# Patient Record
Sex: FEMALE | Race: WHITE | NOT HISPANIC OR LATINO | Employment: UNEMPLOYED | URBAN - METROPOLITAN AREA
[De-identification: names, ages, dates, MRNs, and addresses within clinical notes are randomized per-mention and may not be internally consistent; named-entity substitution may affect disease eponyms.]

---

## 2019-05-24 ENCOUNTER — APPOINTMENT (OUTPATIENT)
Dept: GENERAL RADIOLOGY | Facility: CLINIC | Age: 19
End: 2019-05-24
Attending: EMERGENCY MEDICINE
Payer: COMMERCIAL

## 2019-05-24 ENCOUNTER — HOSPITAL ENCOUNTER (EMERGENCY)
Facility: CLINIC | Age: 19
Discharge: HOME | End: 2019-05-24
Attending: EMERGENCY MEDICINE | Admitting: EMERGENCY MEDICINE
Payer: COMMERCIAL

## 2019-05-24 ENCOUNTER — APPOINTMENT (OUTPATIENT)
Dept: ULTRASOUND IMAGING | Facility: CLINIC | Age: 19
End: 2019-05-24
Attending: EMERGENCY MEDICINE
Payer: COMMERCIAL

## 2019-05-24 VITALS
WEIGHT: 92.15 LBS | TEMPERATURE: 97.6 F | SYSTOLIC BLOOD PRESSURE: 110 MMHG | RESPIRATION RATE: 18 BRPM | DIASTOLIC BLOOD PRESSURE: 56 MMHG | OXYGEN SATURATION: 97 % | HEART RATE: 66 BPM

## 2019-05-24 DIAGNOSIS — R10.31 ABDOMINAL PAIN, RIGHT LOWER QUADRANT: ICD-10-CM

## 2019-05-24 DIAGNOSIS — R07.9 CHEST PAIN, UNSPECIFIED TYPE: ICD-10-CM

## 2019-05-24 LAB
ALBUMIN SERPL-MCNC: 4.4 G/DL (ref 3.4–5)
ALBUMIN UR-MCNC: 20 MG/DL
ALP SERPL-CCNC: 76 U/L (ref 40–150)
ALT SERPL W P-5'-P-CCNC: 22 U/L (ref 0–50)
ANION GAP SERPL CALCULATED.3IONS-SCNC: 6 MMOL/L (ref 3–14)
APPEARANCE UR: CLEAR
AST SERPL W P-5'-P-CCNC: 19 U/L (ref 0–45)
B-HCG FREE SERPL-ACNC: <5 IU/L
BACTERIA #/AREA URNS HPF: ABNORMAL /HPF
BASOPHILS # BLD AUTO: 0.1 10E9/L (ref 0–0.2)
BASOPHILS NFR BLD AUTO: 0.8 %
BILIRUB SERPL-MCNC: 0.2 MG/DL (ref 0.2–1.3)
BILIRUB UR QL STRIP: NEGATIVE
BUN SERPL-MCNC: 15 MG/DL (ref 7–30)
CALCIUM SERPL-MCNC: 8.8 MG/DL (ref 8.5–10.1)
CHLORIDE SERPL-SCNC: 106 MMOL/L (ref 94–109)
CO2 SERPL-SCNC: 25 MMOL/L (ref 20–32)
COLOR UR AUTO: ABNORMAL
CREAT SERPL-MCNC: 0.79 MG/DL (ref 0.52–1.04)
DIFFERENTIAL METHOD BLD: NORMAL
EOSINOPHIL # BLD AUTO: 0.2 10E9/L (ref 0–0.7)
EOSINOPHIL NFR BLD AUTO: 2.4 %
ERYTHROCYTE [DISTWIDTH] IN BLOOD BY AUTOMATED COUNT: 12.2 % (ref 10–15)
GFR SERPL CREATININE-BSD FRML MDRD: >90 ML/MIN/{1.73_M2}
GLUCOSE SERPL-MCNC: 100 MG/DL (ref 70–99)
GLUCOSE UR STRIP-MCNC: NEGATIVE MG/DL
HCT VFR BLD AUTO: 42.8 % (ref 35–47)
HGB BLD-MCNC: 14 G/DL (ref 11.7–15.7)
HGB UR QL STRIP: NEGATIVE
HYALINE CASTS #/AREA URNS LPF: 1 /LPF (ref 0–2)
IMM GRANULOCYTES # BLD: 0 10E9/L (ref 0–0.4)
IMM GRANULOCYTES NFR BLD: 0.3 %
INTERPRETATION ECG - MUSE: NORMAL
KETONES UR STRIP-MCNC: NEGATIVE MG/DL
LEUKOCYTE ESTERASE UR QL STRIP: NEGATIVE
LIPASE SERPL-CCNC: 206 U/L (ref 73–393)
LYMPHOCYTES # BLD AUTO: 3.5 10E9/L (ref 0.8–5.3)
LYMPHOCYTES NFR BLD AUTO: 52.8 %
MCH RBC QN AUTO: 30.4 PG (ref 26.5–33)
MCHC RBC AUTO-ENTMCNC: 32.7 G/DL (ref 31.5–36.5)
MCV RBC AUTO: 93 FL (ref 78–100)
MONOCYTES # BLD AUTO: 0.5 10E9/L (ref 0–1.3)
MONOCYTES NFR BLD AUTO: 7.1 %
MUCOUS THREADS #/AREA URNS LPF: PRESENT /LPF
NEUTROPHILS # BLD AUTO: 2.4 10E9/L (ref 1.6–8.3)
NEUTROPHILS NFR BLD AUTO: 36.6 %
NITRATE UR QL: NEGATIVE
NRBC # BLD AUTO: 0 10*3/UL
NRBC BLD AUTO-RTO: 0 /100
PH UR STRIP: 5 PH (ref 5–7)
PLATELET # BLD AUTO: 243 10E9/L (ref 150–450)
POTASSIUM SERPL-SCNC: 3.5 MMOL/L (ref 3.4–5.3)
PROT SERPL-MCNC: 8.5 G/DL (ref 6.8–8.8)
RBC # BLD AUTO: 4.6 10E12/L (ref 3.8–5.2)
RBC #/AREA URNS AUTO: <1 /HPF (ref 0–2)
SODIUM SERPL-SCNC: 137 MMOL/L (ref 133–144)
SOURCE: ABNORMAL
SP GR UR STRIP: 1.03 (ref 1–1.03)
SQUAMOUS #/AREA URNS AUTO: 1 /HPF (ref 0–1)
TROPONIN I SERPL-MCNC: <0.015 UG/L (ref 0–0.04)
UROBILINOGEN UR STRIP-MCNC: NORMAL MG/DL (ref 0–2)
WBC # BLD AUTO: 6.7 10E9/L (ref 4–11)
WBC #/AREA URNS AUTO: <1 /HPF (ref 0–5)

## 2019-05-24 PROCEDURE — 76856 US EXAM PELVIC COMPLETE: CPT

## 2019-05-24 PROCEDURE — 84702 CHORIONIC GONADOTROPIN TEST: CPT

## 2019-05-24 PROCEDURE — 85025 COMPLETE CBC W/AUTO DIFF WBC: CPT | Performed by: EMERGENCY MEDICINE

## 2019-05-24 PROCEDURE — 84484 ASSAY OF TROPONIN QUANT: CPT | Performed by: EMERGENCY MEDICINE

## 2019-05-24 PROCEDURE — 81001 URINALYSIS AUTO W/SCOPE: CPT | Performed by: EMERGENCY MEDICINE

## 2019-05-24 PROCEDURE — 80053 COMPREHEN METABOLIC PANEL: CPT | Performed by: EMERGENCY MEDICINE

## 2019-05-24 PROCEDURE — 71046 X-RAY EXAM CHEST 2 VIEWS: CPT

## 2019-05-24 PROCEDURE — 25000132 ZZH RX MED GY IP 250 OP 250 PS 637: Performed by: EMERGENCY MEDICINE

## 2019-05-24 PROCEDURE — 83690 ASSAY OF LIPASE: CPT | Performed by: EMERGENCY MEDICINE

## 2019-05-24 PROCEDURE — 93005 ELECTROCARDIOGRAM TRACING: CPT

## 2019-05-24 PROCEDURE — 99285 EMERGENCY DEPT VISIT HI MDM: CPT | Mod: 25

## 2019-05-24 RX ORDER — KETOROLAC TROMETHAMINE 15 MG/ML
15 INJECTION, SOLUTION INTRAMUSCULAR; INTRAVENOUS ONCE
Status: DISCONTINUED | OUTPATIENT
Start: 2019-05-24 | End: 2019-05-24

## 2019-05-24 RX ORDER — L. ACIDOPHILUS/L.BULGARICUS 1MM CELL
1 TABLET ORAL DAILY
Qty: 30 TABLET | Refills: 0 | Status: SHIPPED | OUTPATIENT
Start: 2019-05-24

## 2019-05-24 RX ORDER — IBUPROFEN 200 MG
400 TABLET ORAL ONCE
Status: COMPLETED | OUTPATIENT
Start: 2019-05-24 | End: 2019-05-24

## 2019-05-24 RX ORDER — ACETAMINOPHEN 325 MG/1
975 TABLET ORAL ONCE
Status: COMPLETED | OUTPATIENT
Start: 2019-05-24 | End: 2019-05-24

## 2019-05-24 RX ADMIN — IBUPROFEN 400 MG: 200 TABLET, FILM COATED ORAL at 02:52

## 2019-05-24 RX ADMIN — ACETAMINOPHEN 975 MG: 325 TABLET, FILM COATED ORAL at 02:51

## 2019-05-24 ASSESSMENT — ENCOUNTER SYMPTOMS
VOMITING: 0
DYSURIA: 0
NAUSEA: 0
CONSTIPATION: 0
ABDOMINAL PAIN: 1
DIARRHEA: 0
FEVER: 0

## 2019-05-24 NOTE — ED AVS SNAPSHOT
[facility] Emergency Department  [address]  [facility] [address]  Phone:  [phone]  Fax:  [phone]                                    Jordyn Jackman   MRN: [id number]    Department:  [facility] Emergency Department   Date of Visit:  5/24/2019           After Visit Summary Signature Page    I have received my discharge instructions, and my questions have been answered. I have discussed any challenges I see with this plan with the nurse or doctor.    ..........................................................................................................................................  Patient/Patient Representative Signature      ..........................................................................................................................................  Patient Representative Print Name and Relationship to Patient    ..................................................               ................................................  Date                                   Time    ..........................................................................................................................................  Reviewed by Signature/Title    ...................................................              ..............................................  Date                                               Time          22EPIC Rev 08/18

## 2019-05-24 NOTE — ED TRIAGE NOTES
C/o Mid epigastric pain developed aprox 2-3 days ago .      Noticed pain to RLQ pain develop around the same time. Worse sitting or laying down.    Denies taking OTC meds PTA

## 2019-05-24 NOTE — ED PROVIDER NOTES
"  History     Chief Complaint:    Abdominal pain; chest pain    HPI   Jordyn Jackman is a 19 year old female who presents for evaluation for abdominal and chest pain. The patient states that her chest pain started three days ago and has been constant in nature. Two days ago she also developed epigastric abdominal pain that is is worse when she lays down and alleviated when she stands. Her pain has not changed with eating. She describes her abdominal pain as a \"burning and stabbing\" pain and her chest pain as if there is something that is \"pulling and wriggling/pulsing.\" She took iron supplement prior to arrival because she thought the pain was due to her anemia, and did not find any pain relief. She denies dysuria, nausea, vomiting, constipation and diarrhea. Of note, her last menstruation was last week and it was \"excessive\" and painful.     Allergies:  Penicillins     Medications:    The patient is currently on no regular medications.    Past Medical History:    Constipation  Iron deficiency     Past Surgical History:    The patient does not have any pertinent past surgical history.    Family History:    No past pertinent family history.    Social History:  Negative for tobacco use.  Negative for alcohol use.  Marital Status:  Single     Review of Systems   Constitutional: Negative for fever.   Cardiovascular: Positive for chest pain.   Gastrointestinal: Positive for abdominal pain. Negative for constipation, diarrhea, nausea and vomiting.   Genitourinary: Negative for dysuria and menstrual problem.   All other systems reviewed and are negative.      Physical Exam   First Vitals:  BP: 115/90  Heart Rate: 80  Temp: 97.6  F (36.4  C)  Resp: 18  Weight: 41.8 kg (92 lb 2.4 oz)  SpO2: 100 %      Physical Exam  Constitutional: vitals reviewed  HENT: Moist oral mucosa.   Eyes: Grossly normal vision. Pupils are equal and round. Extraocular movements intact.  Sclera clear and without icterus.   Cardiovascular: Normal " rate. Regular rhythm. S1 and S2 without audible murmurs. 2+ radial pulses. Normal capillary refill.  Respiratory: Effort normal. Clear breath sounds bilaterally.  Gastrointestinal: Soft. No distension. No tenderness to palpation. No rebound or guarding.  Neurologic: Alert and awake. Coordinated movement of extremities. Speech normal.  Skin: No diaphoresis, rashes, ecchymoses, or lesions.  Musculoskeletal: No lower extremity edema. No gross deformity. No joint swelling.  Psychiatric: Appropriate affect. Behavior is normal. Intact recent and remote memory. Linear thought process.      Emergency Department Course   ECG:  Indication: chest pain  Time: 0248 Vent. Rate 71 bpm. TX interval 114. QRS duration 76. QT/QTc 388/421. P-R-T axis 26 14 53.  Normal sinus rhythm. Normal ECG. No significant change compared to EKG dated 7/13/16. Read time: 0251    Imaging:  Radiographic findings were communicated with the patient who voiced understanding of the findings.  XR Chest 2 views:   No evidence of active cardiopulmonary disease as per radiology.    US Pelvic, Complete w/oTransvaginal:  1. Unremarkable appearance of the uterus and right ovary. Blood flow  is visualized in the right ovary.  2. The left ovary is not visualized.  3. No free fluid in the pelvis as per radiology.     Laboratory:  CBC: WBC: 6.7, HGB: 14.0, PLT: 243  CMP: Glucose 100 (H), o/w WNL (Creatinine: 0.79)    Lipase: 206  0243 Troponin: <0.015  ISTAT HCG: <5.0  UA with Microscopic: Protein albumin 20 (A), Bacteria Few (A), mucous present (A), o/w WNL    Interventions:  0251 Tylenol 975 mg PO  0252 Advil 400 mg PO    Emergency Department Course:  Nursing notes and vitals reviewed. (0233) I performed an exam of the patient as documented above.     EKG obtained in the ED, see results above.     The patient provided a urine sample here in the emergency department. This was sent for laboratory testing, findings above.     IV inserted. Medicine administered as  documented above. Blood drawn. This was sent to the lab for further testing, results above.     The patient was sent for a chest XR and pelvis US while in the emergency department, findings above.     0600 I rechecked the patient and discussed the results of her workup thus far.     Findings and plan explained to the Patient. Patient discharged home with instructions regarding supportive care, medications, and reasons to return. The importance of close follow-up was reviewed. The patient was prescribed lactobacillus.     I personally reviewed the laboratory results with the Patient and answered all related questions prior to discharge.     Impression & Plan    Medical Decision Making:  Patient who presents with intermittent severe right lower quadrant abdominal pain over the last few days as well as constant chest pain during the same time interval.  Vitals are stable and exam is unremarkable with no abdominal tenderness or symptoms of abdominal pain at time of ED presentation.  Chest pain was worked up with an ECG, troponin, chest x-ray.  These were all unremarkable.  I have low concern for life-threatening etiologies of her discomfort.  She was given Tylenol and ibuprofen with some improvement.  The intermittent abdominal pain raises question of cyst versus ovarian torsion.  Ultrasound shows a normal right ovary.  The rest of her laboratory work-up and repeat abdominal exams lower my suspicion for appendicitis or other serious is of this pain.  She does have symptoms of IBS in the past, so we will start her on probiotics and instructed on the use of Tylenol and ibuprofen as needed for pain.  Return precautions for worsening pain or fever.  Follow-up with primary care if symptoms are ongoing.  She left the emergency department in improved condition.      Diagnosis:    ICD-10-CM    1. Abdominal pain, right lower quadrant R10.31    2. Chest pain, unspecified type R07.9        Disposition:  discharged to  home    Discharge Medications:     Medication List      Started    lactobacillus Tabs  1 tablet, Oral, DAILY          Scribe Disclosure:  I, Alina CELESTIN, am serving as a scribe on 5/24/2019 at 2:33 AM to personally document services performed by Jaspreet Bashir MD based on my observations and the provider's statements to me.     Scribe Disclosure:  I,  Catarino Marie, am serving as a scribe on 5/24/2019 at 4:21 AM to personally document services performed by Jaspreet Bashir MD based on my observations and the provider's statements to me.         Ailna CELESTIN  5/24/2019   [facility] EMERGENCY DEPARTMENT       Jaspreet Bashir MD  05/24/19 0701

## 2019-08-09 ENCOUNTER — OFFICE VISIT (OUTPATIENT)
Dept: URGENT CARE | Facility: URGENT CARE | Age: 19
End: 2019-08-09
Payer: COMMERCIAL

## 2019-08-09 VITALS
SYSTOLIC BLOOD PRESSURE: 112 MMHG | HEART RATE: 80 BPM | HEIGHT: 68 IN | DIASTOLIC BLOOD PRESSURE: 66 MMHG | WEIGHT: 94 LBS | BODY MASS INDEX: 14.25 KG/M2 | OXYGEN SATURATION: 97 % | TEMPERATURE: 98.6 F

## 2019-08-09 DIAGNOSIS — J06.9 VIRAL UPPER RESPIRATORY TRACT INFECTION: Primary | ICD-10-CM

## 2019-08-09 PROCEDURE — 99204 OFFICE O/P NEW MOD 45 MIN: CPT | Performed by: PHYSICIAN ASSISTANT

## 2019-08-09 RX ORDER — ALBUTEROL SULFATE 90 UG/1
2 AEROSOL, METERED RESPIRATORY (INHALATION) EVERY 6 HOURS
Qty: 6.7 G | Refills: 0 | Status: SHIPPED | OUTPATIENT
Start: 2019-08-09

## 2019-08-09 RX ORDER — BENZONATATE 100 MG/1
100 CAPSULE ORAL 3 TIMES DAILY PRN
Qty: 21 CAPSULE | Refills: 0 | Status: SHIPPED | OUTPATIENT
Start: 2019-08-09

## 2019-08-09 ASSESSMENT — ENCOUNTER SYMPTOMS
NAUSEA: 0
BRUISES/BLEEDS EASILY: 0
CONFUSION: 0
DIARRHEA: 0
NECK PAIN: 0
SORE THROAT: 0
HEADACHES: 0
COUGH: 1
FEVER: 0
CHEST TIGHTNESS: 1
CHILLS: 0
VOMITING: 0

## 2019-08-09 ASSESSMENT — MIFFLIN-ST. JEOR: SCORE: 1239.88

## 2019-08-09 NOTE — PROGRESS NOTES
SUBJECTIVE:   Jordyn Jackman is a 19 year old female presenting with a chief complaint of   Chief Complaint   Patient presents with     URI     some productive cough, tight is chest, also stuffy nose, worse at night and getting , took some Sudafed, Was in [place] May 28- to June 18       She is a new patient of [facility].    URI Adult     Onset of symptoms was 4 day(s) ago.  Course of illness is worsening.    Severity moderate  Current and Associated symptoms: cough - non-productive, chest tightness, stuffy nose.  Notes the cough is a hacking/wet type cough however nonproductive.  Symptoms are worse at night.  She has noted wheezing at night.  No shortness of breath.  She feels like her lungs are not expanding to full capacity.  Denies any chest pain.  Denies fevers or chills.  Treatment measures tried include Sudafed.  Predisposing factors include None.  No history of asthma.      Review of Systems   Constitutional: Negative for chills and fever.   HENT: Negative for sore throat.    Respiratory: Positive for cough and chest tightness.    Cardiovascular: Negative for chest pain.   Gastrointestinal: Negative for diarrhea, nausea and vomiting.   Musculoskeletal: Negative for neck pain.   Skin: Negative for rash.   Allergic/Immunologic: Negative for immunocompromised state.   Neurological: Negative for headaches.   Hematological: Does not bruise/bleed easily.   Psychiatric/Behavioral: Negative for confusion.       Past Medical History:   Diagnosis Date     Constipation      Iron deficiency      History reviewed. No pertinent family history.  Current Outpatient Medications   Medication Sig Dispense Refill     albuterol (PROAIR HFA/PROVENTIL HFA/VENTOLIN HFA) 108 (90 Base) MCG/ACT inhaler Inhale 2 puffs into the lungs every 6 hours as needed for wheezing/shortnes of breath 6.7 g 0     benzonatate (TESSALON) 100 MG capsule Take 1 capsule (100 mg) by mouth 3 times daily as needed for cough 21 capsule 0      "lactobacillus TABS Take 1 tablet by mouth daily 30 tablet 0     UNKNOWN TO PATIENT        Social History     Tobacco Use     Smoking status: Never Smoker     Smokeless tobacco: Never Used   Substance Use Topics     Alcohol use: Not Currently       OBJECTIVE  /66 (BP Location: Right arm, Patient Position: Chair, Cuff Size: Adult Regular)   Pulse 80   Temp 98.6  F (37  C) (Oral)   Ht 1.727 m (5' 8\")   Wt 42.6 kg (94 lb)   LMP 07/26/2019   SpO2 97%   Breastfeeding? No   BMI 14.29 kg/m      Physical Exam   Constitutional: She appears well-developed and well-nourished. No distress.   HENT:   Head: Normocephalic and atraumatic.   Right Ear: External ear normal.   Left Ear: External ear normal.   Mouth/Throat: Oropharynx is clear and moist.   Eyes: Conjunctivae are normal.   Neck: Normal range of motion.   Cardiovascular: Regular rhythm and normal heart sounds.   Pulmonary/Chest: Effort normal and breath sounds normal. No respiratory distress. She has no wheezes. She has no rales.   Neurological: She is alert.   Skin: Skin is warm and dry.   Psychiatric: She has a normal mood and affect.       Labs:  No results found for this or any previous visit (from the past 24 hour(s)).        ASSESSMENT:      ICD-10-CM    1. Viral upper respiratory tract infection J06.9 albuterol (PROAIR HFA/PROVENTIL HFA/VENTOLIN HFA) 108 (90 Base) MCG/ACT inhaler     benzonatate (TESSALON) 100 MG capsule          PLAN:    URI: Supportive care measures advised.  Albuterol inhaler Rx as needed for chest tightness/wheezing. Tessalon Perles Rx as needed for cough.  Patient educational information provided. Push fluids.  Rest.  Follow-up if any worsening symptoms. Patient understands and agrees with the plan.        Followup:    If not improving or if condition worsens, follow up with your Primary Care Provider    Patient Instructions     Patient Education     Viral Upper Respiratory Illness with Wheezing (Adult)    You have a viral upper " respiratory illness (URI), which is another term for the common cold. When the infection causes a lot of irritation, the air passages can go into spasm. This causes wheezing and shortness of breath.  This illness is contagious during the first few days. It is spread through the air by coughing and sneezing. It may also be spread by direct contact. This could be by touching the sick person and then touching your own eyes, nose, or mouth. Frequent handwashing will decrease the risk.  Most viral illnesses go away within 7 to 10 days with rest and simple home remedies. Sometimes the illness may last for several weeks. Antibiotics will not kill a virus, and they are generally not prescribed for this condition.  Home care    If symptoms are severe, rest at home for the first 2 to 3 days. When you resume activity, don't let yourself get too tired.    If you smoke, stop. Ask your healthcare provider if you need help.    Stay away from secondhand cigarette smoke. Don't let people smoke in your house or car.    You may use acetaminophen or ibuprofen to control pain and fever, unless another medicine was prescribed. Take the medicine only as directed on the label. If you have chronic liver or kidney disease, have ever had a stomach ulcer or gastrointestinal bleeding, or are taking blood-thinning medicines, talk with your healthcare provider before using these medicines. Aspirin should never be given to anyone under 18 years of age who is ill with a viral infection or fever. It may cause severe liver or brain damage.    Your appetite may be poor, so a light diet is fine. Stay well hydrated by drinking 6 to 8 glasses of fluids per day (water, soft drinks, juices, tea, or soup). Extra fluids will help loosen secretions in the nose and lungs.    Over-the-counter cold medicines will not shorten the length of time you re sick, but they may be helpful for the following symptoms: cough, sore throat, and nasal and sinus congestion. Ask  your healthcare provider or pharmacist which over-the-counter medicine to use. Don't use decongestants if you have high blood pressure.  Follow-up care  Follow up with your healthcare provider, or as advised.  When to seek medical advice  Call your healthcare provider right away if any of these occur:    Cough with lots of colored sputum (mucus)    Severe headache; face, neck, or ear pain    Difficulty swallowing due to throat pain    Fever of 100.4 F (38 C) or higher, or as directed by your healthcare provider  Call 911  Call 911 if any of these occur:    Chest pain, shortness of breath, worsening wheezing, or difficulty breathing    Coughing up blood    Very severe pain when swallowing, especially if it goes along with a muffled voice  Date Last Reviewed: 6/1/2018 2000-2018 [employer]. [address] [place] [address], [place] [address]. All rights reserved. This information is not intended as a substitute for professional medical care. Always follow your healthcare professional's instructions.

## 2019-08-09 NOTE — PATIENT INSTRUCTIONS
Patient Education     Viral Upper Respiratory Illness with Wheezing (Adult)    You have a viral upper respiratory illness (URI), which is another term for the common cold. When the infection causes a lot of irritation, the air passages can go into spasm. This causes wheezing and shortness of breath.  This illness is contagious during the first few days. It is spread through the air by coughing and sneezing. It may also be spread by direct contact. This could be by touching the sick person and then touching your own eyes, nose, or mouth. Frequent handwashing will decrease the risk.  Most viral illnesses go away within 7 to 10 days with rest and simple home remedies. Sometimes the illness may last for several weeks. Antibiotics will not kill a virus, and they are generally not prescribed for this condition.  Home care    If symptoms are severe, rest at home for the first 2 to 3 days. When you resume activity, don't let yourself get too tired.    If you smoke, stop. Ask your healthcare provider if you need help.    Stay away from secondhand cigarette smoke. Don't let people smoke in your house or car.    You may use acetaminophen or ibuprofen to control pain and fever, unless another medicine was prescribed. Take the medicine only as directed on the label. If you have chronic liver or kidney disease, have ever had a stomach ulcer or gastrointestinal bleeding, or are taking blood-thinning medicines, talk with your healthcare provider before using these medicines. Aspirin should never be given to anyone under 18 years of age who is ill with a viral infection or fever. It may cause severe liver or brain damage.    Your appetite may be poor, so a light diet is fine. Stay well hydrated by drinking 6 to 8 glasses of fluids per day (water, soft drinks, juices, tea, or soup). Extra fluids will help loosen secretions in the nose and lungs.    Over-the-counter cold medicines will not shorten the length of time you re sick, but  they may be helpful for the following symptoms: cough, sore throat, and nasal and sinus congestion. Ask your healthcare provider or pharmacist which over-the-counter medicine to use. Don't use decongestants if you have high blood pressure.  Follow-up care  Follow up with your healthcare provider, or as advised.  When to seek medical advice  Call your healthcare provider right away if any of these occur:    Cough with lots of colored sputum (mucus)    Severe headache; face, neck, or ear pain    Difficulty swallowing due to throat pain    Fever of 100.4 F (38 C) or higher, or as directed by your healthcare provider  Call 911  Call 911 if any of these occur:    Chest pain, shortness of breath, worsening wheezing, or difficulty breathing    Coughing up blood    Very severe pain when swallowing, especially if it goes along with a muffled voice  Date Last Reviewed: 6/1/2018 2000-2018 [employer]. [address] [place] [address], [place] [address]. All rights reserved. This information is not intended as a substitute for professional medical care. Always follow your healthcare professional's instructions.

## 2020-03-14 ENCOUNTER — HOSPITAL ENCOUNTER (EMERGENCY)
Facility: CLINIC | Age: 20
Discharge: HOME | End: 2020-03-14
Attending: EMERGENCY MEDICINE | Admitting: EMERGENCY MEDICINE
Payer: COMMERCIAL

## 2020-03-14 ENCOUNTER — APPOINTMENT (OUTPATIENT)
Dept: GENERAL RADIOLOGY | Facility: CLINIC | Age: 20
End: 2020-03-14
Attending: EMERGENCY MEDICINE
Payer: COMMERCIAL

## 2020-03-14 VITALS
BODY MASS INDEX: 13.64 KG/M2 | RESPIRATION RATE: 18 BRPM | DIASTOLIC BLOOD PRESSURE: 64 MMHG | TEMPERATURE: 97.4 F | HEIGHT: 68 IN | WEIGHT: 90 LBS | SYSTOLIC BLOOD PRESSURE: 99 MMHG | OXYGEN SATURATION: 99 % | HEART RATE: 73 BPM

## 2020-03-14 DIAGNOSIS — M25.511 ACUTE PAIN OF RIGHT SHOULDER: ICD-10-CM

## 2020-03-14 DIAGNOSIS — M67.911 TENDINOPATHY OF RIGHT ROTATOR CUFF: ICD-10-CM

## 2020-03-14 PROCEDURE — 73030 X-RAY EXAM OF SHOULDER: CPT | Mod: RT

## 2020-03-14 PROCEDURE — 99285 EMERGENCY DEPT VISIT HI MDM: CPT

## 2020-03-14 ASSESSMENT — MIFFLIN-ST. JEOR: SCORE: 1221.74

## 2020-03-14 ASSESSMENT — ENCOUNTER SYMPTOMS
FEVER: 0
ARTHRALGIAS: 1
NECK PAIN: 1
COUGH: 0

## 2020-03-14 NOTE — ED TRIAGE NOTES
Here for right shoulder pain after patient felt a click moving her right arm while half sleep in bed. ABCs intact.

## 2020-03-14 NOTE — ED AVS SNAPSHOT
[facility] Emergency Department  [address]  [facility] [address]  Phone:  [phone]  Fax:  [phone]                                    Jordyn Jackman   MRN: [id number]    Department:  [facility] Emergency Department   Date of Visit:  3/14/2020           After Visit Summary Signature Page    I have received my discharge instructions, and my questions have been answered. I have discussed any challenges I see with this plan with the nurse or doctor.    ..........................................................................................................................................  Patient/Patient Representative Signature      ..........................................................................................................................................  Patient Representative Print Name and Relationship to Patient    ..................................................               ................................................  Date                                   Time    ..........................................................................................................................................  Reviewed by Signature/Title    ...................................................              ..............................................  Date                                               Time          22EPIC Rev 08/18

## 2020-03-14 NOTE — ED PROVIDER NOTES
"  History     Chief Complaint:  Shoulder Pain    HPI   Jordyn Jackman is a 19 year old female who presents with shoulder pain. The patient states she was half-asleep moving in bed. She says her right arm was raised up and she tried to move it down. She reports feeling a click in her right shoulder when moving her arm down, resulting in sharp shoulder pain that radiates the entire shoulder. During evaluation, the patient notes it hurts to turn her neck to the left.    Allergies:  Penicillins     Medications:    Flonase  Albuterol  Tessalon  Retin-A    Past Medical History:    Anxiety  Underweight  Dysmenorrhea  Frequent menstruation    Past Surgical History:    Tonsillectomy  Oral surgery  [place] teeth extraction    Family History:    Ovarian cancer    Social History:  Smoking status: No  Alcohol use: No  Drug use: No  The patient presents to the emergency department with father.  Marital Status:  Single [1]     Review of Systems   Constitutional: Negative for fever.   Respiratory: Negative for cough.    Musculoskeletal: Positive for arthralgias and neck pain.   All other systems reviewed and are negative.        Physical Exam     Patient Vitals for the past 24 hrs:   BP Temp Temp src Pulse Heart Rate Resp SpO2 Height Weight   03/14/20 0208 99/64 97.4  F (36.3  C) Oral 73 73 18 99 % 1.727 m (5' 8\") 40.8 kg (90 lb)       Physical Exam  I have reviewed the triage vital signs    Constitutional: Appears stated age. Thin.  Eyes: No discharge, symmetrical lids  ENT: Moist mucous membranes, no ear discharge  Neck: Full range of motion  Respiratory: CTAB, no wheezes  Cardiovascular: Regular rate and rhythm, no lower extremity edema  Chest: Equal rise  Gastrointestinal: Soft. Nondistended. NTTP  Musculoskeletal: No gross deformities. FPROM R shoulder without obvious deformity.  Pain with passive abduction in painful arc.  Pain with internal rotation.  Skin: Warm and well perfused. No visible rash.  Neurologic: Moves all " extremities, speech fluent without dysarthria  Psychiatric: Appropriate affect, alert and interactive    Emergency Department Course   Imaging:  Radiographic findings were communicated with the patient who voiced understanding of the findings.    XR Shoulder Right G/E 3 Views  IMPRESSION: No visualized acute fracture or malalignment of the right shoulder. Note that the evaluation of the alignment of the right glenohumeral joint is limited due to lack of an axillary projection image. Additionally, adequate external rotation was not achieved, limiting evaluation of the greater tuberosity of the proximal humerus.    As read by Radiology.    Interventions:  Medications - No data to display      Emergency Department Course:  Past medical records, nursing notes, and vitals reviewed.  0500: I performed an exam of the patient and obtained history, as documented above.    The patient was sent for a right shoulder x-ray while in the emergency department, findings above.     0510: Findings and plan explained to the Patient and father. Patient discharged home with instructions regarding supportive care, medications, and reasons to return. The importance of close follow-up was reviewed.     Impression & Plan    Medical Decision Makin-year-old female presenting with right shoulder pain after moving it in a certain way while sleeping.  Differential includes rotator cuff tendinopathy, impingement syndrome, fracture, dislocation.  X-ray of the shoulder is negative for acute bony pathology.  Exam as above.  Consistent with rotator cuff tendinopathy.  Patient was advised on rest, heating therapy, and physical therapy exercises.  She was advised to follow-up with her PCP for evaluation for possible MRI if symptoms do not improve or worsen.    Diagnosis:    ICD-10-CM    1. Acute pain of right shoulder  M25.511    2. Tendinopathy of right rotator cuff  M67.911        Disposition:  discharged to home    Discharge  Medications:  Discharge Medication List as of 3/14/2020  5:26 AM            Scribe Disclosure:  I, Lucille Gracia, am serving as a scribe at 4:46 AM on 3/14/2020 to document services personally performed by Shukri Moore MD based on my observations and the provider's statements to me.    [facility] EMERGENCY DEPARTMENT      Shukri Moore MD  03/14/20 0543

## 2021-08-23 VITALS
DIASTOLIC BLOOD PRESSURE: 88 MMHG | TEMPERATURE: 98.6 F | HEART RATE: 89 BPM | OXYGEN SATURATION: 99 % | SYSTOLIC BLOOD PRESSURE: 127 MMHG | RESPIRATION RATE: 16 BRPM

## 2021-08-23 PROCEDURE — 99283 EMERGENCY DEPT VISIT LOW MDM: CPT

## 2021-08-24 ENCOUNTER — HOSPITAL ENCOUNTER (EMERGENCY)
Facility: CLINIC | Age: 21
Discharge: HOME | End: 2021-08-24
Attending: EMERGENCY MEDICINE | Admitting: EMERGENCY MEDICINE
Payer: COMMERCIAL

## 2021-08-24 VITALS
TEMPERATURE: 98.2 F | OXYGEN SATURATION: 98 % | DIASTOLIC BLOOD PRESSURE: 77 MMHG | RESPIRATION RATE: 18 BRPM | HEART RATE: 77 BPM | SYSTOLIC BLOOD PRESSURE: 125 MMHG

## 2021-08-24 DIAGNOSIS — M62.830 BACK MUSCLE SPASM: ICD-10-CM

## 2021-08-24 DIAGNOSIS — R20.2 PARESTHESIA: ICD-10-CM

## 2021-08-24 DIAGNOSIS — T50.905A ADVERSE EFFECT OF DRUG, INITIAL ENCOUNTER: ICD-10-CM

## 2021-08-24 PROCEDURE — 96374 THER/PROPH/DIAG INJ IV PUSH: CPT

## 2021-08-24 PROCEDURE — 250N000011 HC RX IP 250 OP 636: Performed by: EMERGENCY MEDICINE

## 2021-08-24 PROCEDURE — 99284 EMERGENCY DEPT VISIT MOD MDM: CPT | Mod: 25

## 2021-08-24 PROCEDURE — 96375 TX/PRO/DX INJ NEW DRUG ADDON: CPT

## 2021-08-24 PROCEDURE — 250N000013 HC RX MED GY IP 250 OP 250 PS 637: Performed by: EMERGENCY MEDICINE

## 2021-08-24 PROCEDURE — 250N000012 HC RX MED GY IP 250 OP 636 PS 637: Performed by: EMERGENCY MEDICINE

## 2021-08-24 PROCEDURE — 250N000009 HC RX 250: Performed by: EMERGENCY MEDICINE

## 2021-08-24 RX ORDER — DIPHENHYDRAMINE HYDROCHLORIDE 50 MG/ML
12.5 INJECTION INTRAMUSCULAR; INTRAVENOUS ONCE
Status: COMPLETED | OUTPATIENT
Start: 2021-08-24 | End: 2021-08-24

## 2021-08-24 RX ORDER — PREDNISOLONE 15 MG/5 ML
40 SOLUTION, ORAL ORAL DAILY
Status: DISCONTINUED | OUTPATIENT
Start: 2021-08-24 | End: 2021-08-24

## 2021-08-24 RX ORDER — PREDNISOLONE SODIUM PHOSPHATE 15 MG/5ML
40 SOLUTION ORAL ONCE
Status: COMPLETED | OUTPATIENT
Start: 2021-08-24 | End: 2021-08-24

## 2021-08-24 RX ORDER — HYDROCODONE BITARTRATE AND ACETAMINOPHEN 7.5; 325 MG/15ML; MG/15ML
10 SOLUTION ORAL EVERY 6 HOURS PRN
Qty: 100 ML | Refills: 0 | Status: SHIPPED | OUTPATIENT
Start: 2021-08-24

## 2021-08-24 RX ORDER — CYCLOBENZAPRINE HCL 10 MG
10 TABLET ORAL EVERY 8 HOURS PRN
Qty: 20 TABLET | Refills: 0 | Status: SHIPPED | OUTPATIENT
Start: 2021-08-24 | End: 2021-08-31

## 2021-08-24 RX ORDER — PREDNISOLONE 15 MG/5 ML
40 SOLUTION, ORAL ORAL DAILY
Qty: 70 ML | Refills: 0 | Status: SHIPPED | OUTPATIENT
Start: 2021-08-24 | End: 2021-08-29

## 2021-08-24 RX ORDER — PREDNISOLONE SODIUM PHOSPHATE 15 MG/5ML
40 SOLUTION ORAL ONCE
Status: DISCONTINUED | OUTPATIENT
Start: 2021-08-24 | End: 2021-08-24

## 2021-08-24 RX ORDER — HYDROCODONE BITARTRATE AND ACETAMINOPHEN 7.5; 325 MG/15ML; MG/15ML
10 SOLUTION ORAL ONCE
Status: COMPLETED | OUTPATIENT
Start: 2021-08-24 | End: 2021-08-24

## 2021-08-24 RX ORDER — DIAZEPAM 5 MG
5 TABLET ORAL ONCE
Status: COMPLETED | OUTPATIENT
Start: 2021-08-24 | End: 2021-08-24

## 2021-08-24 RX ADMIN — HYDROCODONE BITARTRATE AND ACETAMINOPHEN 10 ML: 7.5; 325 SOLUTION ORAL at 01:02

## 2021-08-24 RX ADMIN — FAMOTIDINE 20 MG: 10 INJECTION INTRAVENOUS at 19:09

## 2021-08-24 RX ADMIN — DIPHENHYDRAMINE HYDROCHLORIDE 12.5 MG: 50 INJECTION, SOLUTION INTRAMUSCULAR; INTRAVENOUS at 19:09

## 2021-08-24 RX ADMIN — DIAZEPAM 5 MG: 5 TABLET ORAL at 01:03

## 2021-08-24 RX ADMIN — PREDNISOLONE SODIUM PHOSPHATE 40 MG: 15 SOLUTION ORAL at 01:02

## 2021-08-24 ASSESSMENT — ENCOUNTER SYMPTOMS
MYALGIAS: 1
NECK PAIN: 1
BACK PAIN: 1
FACIAL SWELLING: 0
SORE THROAT: 1
NECK STIFFNESS: 1
TROUBLE SWALLOWING: 1

## 2021-08-24 NOTE — ED PROVIDER NOTES
History   Chief Complaint:  Allergic Reaction     The history is provided by the patient.      Jordyn Jackman is a 21 year old female with history of anxiety who presents with an allergic reaction. The patient's father reports that last night they were here at [facility] being evaluated for right shoulder pain and thought to have a pinched nerve. She received prednisone and at first mentioned that she had a throat burning sensation. She was discharged home on hydrocodone, flexeril, and prednisone. Tonight, the patient had just finished eating dinner and had then tried taking her prednisone but was unable to finish it as she had a burning sensation down her throat. She felt that her throat was sore and has since had troubles swallowing. Her father does note that she has always had troubles swallowing pills and usually gets the liquid form. No vomiting, skin rashes or trouble breathing.    Review of Systems   HENT: Positive for sore throat and trouble swallowing. Negative for facial swelling.    All other systems reviewed and are negative.      Allergies:  Penicillins    Medications:  Albuterol inhaler   Flexeril   Prednisone   Hydrocodone- acetaminophen     Past Medical History:    Constipation   Iron deficiency   Underweight   Anxiety     Past Surgical History:    Tonsillectomy   Oral surgery   [place] teeth extraction    Family History:    Cancer, mother     Social History:  Smoking status: never smoker  Alcohol use: no  Drug use: no  PCP: [facility]  Presents to the ED with her father     Physical Exam     Patient Vitals for the past 24 hrs:   BP Temp Temp src Pulse Resp SpO2   08/24/21 1930 -- -- -- 77 14 99 %   08/24/21 1900 -- -- -- 94 13 95 %   08/24/21 1840 125/77 98.2  F (36.8  C) Oral 117 20 96 %       Physical Exam  General:              Well-nourished              Anxious appearing, tearful              Thin appearing young female  Eyes:              Conjunctiva without injection or  scleral icterus  ENT:              Moist mucous membranes              No lip or tongue swelling              Tolerating secretions without difficulty              No oral lesions              Nares patent              Pinnae normal  Neck:              Full ROM              No stiffness appreciated              No stridor  Resp:              Lungs CTAB              No crackles, wheezing or audible rubs              Good air movement  CV:                    Normal rate, regular rhythm              S1 and S2 present              No murmur, gallop or rub  GI:              BS present              Abdomen soft without distention              Non-tender to light and deep palpation              No guarding or rebound tenderness  Skin:              Warm, dry, well perfused              No rashes or open wounds on exposed skin  MSK:              Moves all extremities              No focal deformities or swelling  Neuro:              Alert              Answers questions appropriately              Moves all extremities equally              Gait stable  Psych:              Normal affect, normal mood      Emergency Department Course     Emergency Department Course:  Reviewed:  I reviewed the patient's nursing notes, vitals, past medical records, Care Everywhere.     Assessments:  1850 I performed an assessment and examination of the patient as noted above.      2009 The patient notes that she is feeling better on recheck. Findings and plan explained to the Patient and her father. Patient discharged home with instructions regarding supportive care, medications, and reasons to return. The importance of close follow-up was reviewed.     Interventions:  1909 Benadryl 12.5 mg, IV   1909 Pepcid, 20 mg, IV    Disposition:  The patient was discharged to home.       Impression & Plan   Medical Decision Making:  Jordyn Jackman is a 21-year-old female presenting to the emergency department for evaluation of an allergic reaction.  VS on  presentation reveal elevated HR, which improved during her ER course.  Examination as noted above, notable for anxious appearing young female, clearing throat, though no stridor, intolerance of secretions, or abnormal phonation.  Her symptomatology are suspicious for adverse drug reaction.  Her symptoms began rather abruptly after consumption of oral liquid prednisone, per father, which would be quite early to have achieved systemic absorption at that time.  No other manifestations of anaphylaxis are noted.  Patient was provided oral Benadryl and Pepcid.  On reassessment, her symptoms are notably improved.  She developed no new allergic phenomenon, and was able to tolerate p.o. without difficulty.  At this time, I feel she is appropriate for discharge from the ER.  Recommended avoidance of prednisone moving forward.  She does have an appointment to follow-up with her PCP as well as [facility] orthopedics regarding her cervical radiculopathy which I feel to be reasonable.  She will return to the ER should she develop any rebound phenomenon such as shortness of breath, lip or tongue swelling, or any other new or troubling symptoms.  All questions were answered prior to discharge.      Diagnosis:    ICD-10-CM    1. Adverse effect of drug, initial encounter  T50.905A        Scribe Disclosure:  I, Dorene Marc, am serving as a scribe at 6:50 PM on 8/24/2021 to document services personally performed by Roberto Carlos Burns MD based on my observations and the provider's statements to me.        Roberto Carlos Burns MD  08/24/21 6996

## 2021-08-24 NOTE — ED PROVIDER NOTES
History   Chief Complaint:  Numbness       The history is provided by the patient.      Jordyn Jackman is a 21 year old female with history of anemia who presents with right arm and upper back pain that started 4 days ago. States pain was originally isolated to the right arm with numbness and tingling that shot up and down the entire arm. Pain is now radiating to the right neck, upper back and shoulder blade. Reports the pain is not intermittently shooting down the left extremities. Pain is exacerbated by laying down or leaning back.     Review of Systems   Musculoskeletal: Positive for back pain, myalgias, neck pain and neck stiffness.   All other systems reviewed and are negative.      Allergies:  Penicillins    Medications:  The patient is currently on no regular medications.    Past Medical History:    Anemia   Anxiety    Social History:  Presents to the ED with her father.  Presents via car.    Physical Exam     Patient Vitals for the past 24 hrs:   BP Temp Temp src Pulse Resp SpO2   08/23/21 2018 127/88 98.6  F (37  C) Temporal 89 16 99 %       Physical Exam  General/Appearance: appears stated age, well-groomed, appears uncomfortable, thin  Eyes: EOMI, no scleral injection, no icterus  ENT: MMM  Neck: supple, nl ROM, no stiffness  Cardiovascular: RRR, nl S1S2, no m/r/g, 2+ pulses in all 4 extremities, cap refill <2sec  Respiratory: CTAB, good air movement throughout, no wheezes/rhonchi/rales, no increased WOB, no retractions  Back: no lesions, reproducible ttp to R paraspinous mm to cervical and upper thorax region as well as superior R trapezius  MSK: SOLORZANO, good tone, no bony abnormality  Skin: warm and well-perfused, no rash, no edema, no ecchymosis, nl turgor  Neuro: GCS 15, alert and oriented, no gross focal neuro deficits -- nl sensation and strength to RUE  Heme: no petechia, no purpura, no active bleeding        Emergency Department Course     Emergency Department Course:    Reviewed:  I reviewed  nursing notes, vitals, past medical history and care everywhere    Assessments:  0016 I obtained history and examined the patient as noted above.     Interventions:  0102 Hydrocodone-acetaminophen 7.5-325 mg/15 mL 10 mL PO  0102 Orapred 15 mg/5 mL 40 mg PO  0103 Valium 5 mg PO    Disposition:  The patient was discharged to home.       Impression & Plan     CMS Diagnoses: None    Medical Decision Making:  This patient is a pleasant 21-year-old female who presents with right upper extremity pain that is been radiating down her arm the past couple days now with some upper right posterior back and shoulder pain.  I suspect she has radiculopathy from a cervical disc versus peripheral nervous leading to the arm symptoms.  Neurovascularly she is stable to this extremity without any swelling, signs of infection, concerns for DVT.  I think because of the pain she has been holding her right upper extremity differently, leading to some muscle spasm in her shoulder and upper back.  My physical exam is able to reproduce the shoulder and upper back pain.  I think at this point in time we treat this like cervical radiculopathy with pain meds, muscle relaxants, steroids.  She may ultimately need to see an orthopedist to discuss the possibility of getting an MRI.  I have advised her in the meantime to try heat, massage.  She will be discharged home with several scripts to try to help her with her discomfort.      Diagnosis:    ICD-10-CM    1. Paresthesia  R20.2    2. Back muscle spasm  M62.830        Discharge Medications:  New Prescriptions    CYCLOBENZAPRINE (FLEXERIL) 10 MG TABLET    Take 1 tablet (10 mg) by mouth every 8 hours as needed for muscle spasms    HYDROCODONE-ACETAMINOPHEN 7.5-325 MG/15ML SOLUTION    Take 10 mLs by mouth every 6 hours as needed for severe pain    PREDNISOLONE (ORAPRED/PRELONE) 15 MG/5ML SOLUTION    Take 13.3 mLs (40 mg) by mouth daily for 5 days       Scribe Disclosure:  I, Love Martin, am serving  as a scribe at 12:16 AM on 8/24/2021 to document services personally performed by Tali Moreno MD based on my observations and the provider's statements to me.            Tali Moreno MD  08/24/21 0116     no

## 2021-08-24 NOTE — ED TRIAGE NOTES
Pt presents with dad, was seen yesterday for arm numbness.   Prescribed prednisone, took a dose yesterday, second dose taken today about 20 mins PTA. Pt instantly c/o burning in throat and tightness.   No known allergy to prednisone.  Pt whimpering during triage about IV access.    Pt ABCs intact, A&Ox4.

## 2021-08-24 NOTE — ED TRIAGE NOTES
Pt here for numbness that radiates down her R arm and now her left arm x4 days. A+Ox4, cms intact.

## 2021-08-25 NOTE — DISCHARGE INSTRUCTIONS
Please monitor symptoms closely.  Follow-up with primary care provider as discussed as well as orthopedics as scheduled.    Return to ER with worsening pain, throat discomfort, difficulty swallowing, difficulties breathing, or any other concerns.

## 2023-02-14 ENCOUNTER — HOSPITAL ENCOUNTER (EMERGENCY)
Facility: CLINIC | Age: 23
Discharge: HOME | End: 2023-02-15
Attending: EMERGENCY MEDICINE | Admitting: EMERGENCY MEDICINE
Payer: COMMERCIAL

## 2023-02-14 DIAGNOSIS — G62.9 NEUROPATHY: ICD-10-CM

## 2023-02-14 PROCEDURE — 99283 EMERGENCY DEPT VISIT LOW MDM: CPT

## 2023-02-15 VITALS
RESPIRATION RATE: 16 BRPM | SYSTOLIC BLOOD PRESSURE: 124 MMHG | OXYGEN SATURATION: 96 % | TEMPERATURE: 98 F | HEART RATE: 86 BPM | DIASTOLIC BLOOD PRESSURE: 65 MMHG

## 2023-02-15 PROCEDURE — 250N000013 HC RX MED GY IP 250 OP 250 PS 637: Performed by: EMERGENCY MEDICINE

## 2023-02-15 RX ORDER — OXYCODONE HCL 5 MG/5 ML
5 SOLUTION, ORAL ORAL ONCE
Status: COMPLETED | OUTPATIENT
Start: 2023-02-15 | End: 2023-02-15

## 2023-02-15 RX ORDER — GABAPENTIN 100 MG/1
200 CAPSULE ORAL 3 TIMES DAILY
Qty: 120 CAPSULE | Refills: 0 | Status: SHIPPED | OUTPATIENT
Start: 2023-02-15 | End: 2023-03-07

## 2023-02-15 RX ADMIN — OXYCODONE HYDROCHLORIDE 5 MG: 5 SOLUTION ORAL at 01:30

## 2023-02-15 ASSESSMENT — ENCOUNTER SYMPTOMS
BLOOD IN STOOL: 0
NUMBNESS: 1
FACIAL ASYMMETRY: 0
FEVER: 0
NAUSEA: 1
VOMITING: 0
NECK PAIN: 1
ARTHRALGIAS: 1

## 2023-02-15 ASSESSMENT — ACTIVITIES OF DAILY LIVING (ADL): ADLS_ACUITY_SCORE: 33

## 2023-02-15 NOTE — ED PROVIDER NOTES
"  History     Chief Complaint:  Arm Pain       HPI   Jordyn Jackman is a left-handed 22 year old female who presents with pain of both forearms and hands, present for the past month and worsening in the past week, and pain of the neck, beginning one week ago. She reports numbness, tingling, and pain of both arms with concern for a pinched nerve. She saw a hand surgeon previously and was recommended for an ENG. She came in tonight because her pain was a 10/10 in her left arm, preventing her from sleeping. Pain is a 7/10 in the ED. She also reports a \"full body twitch\" earlier this evening. Denies vision problems, face changes, incontinence, and gait issues. Denies vomiting, fever, bloody stools. Reports nausea with ibuprofen use. She has taken ibuprofen and Tylenol without relief.    Independent Historian: Patient's father reports her work involves repetitive motions involving her hands and wrists. She first complained of pain 1 month ago and was unable to perform her job for the past week.     Review of External Notes: Physical therapy visit on 4/20/2022 where she is being treated for cervical radiculopathy.  She is requesting tingling from the elbow down to the fingers during those visits as well.    ROS:  Review of Systems   Constitutional: Negative for fever.   Eyes: Negative for visual disturbance.   Gastrointestinal: Positive for nausea. Negative for blood in stool and vomiting.   Genitourinary: Negative for enuresis.   Musculoskeletal: Positive for arthralgias and neck pain. Negative for gait problem.   Neurological: Positive for numbness. Negative for facial asymmetry.   All other systems reviewed and are negative.    Allergies:  Penicillins  Prednisone     Medications:    Albuterol inhaler     Past Medical History:    Constipation  Iron deficiency  Underweight  Acne  Anxiety  Dysmenorrhea    Past Surgical History:    Tonsillectomy   [place] teeth extraction      Family History:    Mother- ovarian cancer "     Social History:  The patient presents to the ED his her father via private vehicle.  PCP: [facility]     Physical Exam     Patient Vitals for the past 24 hrs:   BP Temp Temp src Pulse Resp SpO2   02/15/23 0132 -- -- -- -- 16 --   02/14/23 2238 124/65 98  F (36.7  C) Oral 86 18 96 %        Physical Exam  Constitutional: Well appearing.  HEENT: Atraumatic.  Moist mucous membranes.  Neck: Soft.  Supple.  No tenderness to palpation.  Cardiac: Regular rate and rhythm.  No murmur or rub.  Respiratory: Clear to auscultation bilaterally.  No respiratory distress.  No wheezing, rhonchi, or rales.  Abdomen: Soft and nontender.  Nondistended.  Musculoskeletal: No edema.  Normal range of motion.  Neurologic: Alert and oriented x3.  Normal tone and bulk.  Cranial nerves II through XII intact.  5/5 strength in bilateral upper and lower extremities.  She reports some decrease sensation from the elbows down mostly on the medial aspect of both arms, however, remainder of sensation to light touch intact throughout.  DTRs 2+ at the brachioradialis bilaterally.  Normal gait.  Skin: No rashes.  No edema.  Psych: Normal affect.  Normal behavior.      Emergency Department Course   Emergency Department Course & Assessments:  ED Course as of 02/15/23 0152   Wed Feb 15, 2023   0122 I obtained the history and examined the patient as noted above.           Interventions:  Medications   oxyCODONE (ROXICODONE) solution 5 mg (5 mg Oral Given 2/15/23 0130)      Social Determinants of Health affecting care:  None      Disposition:  The patient was discharged to home.     Impression & Plan    Medical Decision Making:  Jordyn Jackman is a 22-year-old woman who is afebrile and hemodynamically stable.  She has radicular type pain with no weakness at this time.  It seems more of a stocking glove distribution from the elbow down but also more medially and may be consistent with repetitive use of her arms.  At this time, there  is no fever or meningismus and she appears very well.  It seems most significantly was pain uncontrolled tonight in the neck and she could not sleep. I see no indication for blood work or emergent MRI.  Discussed this with the patient and father and they are in agreement discussed plan for home with a dose of pain medication right now and starting gabapentin and I placed a referral for neurology clinic follow-up as I think that would be most beneficial for her given her multiple issues and chronic issues.  They feel comfortable this plan.  Discussed supportive care at home and very strict return precautions including any signs symptoms that require emergent MRI such as weakness or other concerning symptoms and she is understanding.  Their questions were answered and she was in no distress at time of discharge.    Diagnosis:    ICD-10-CM    1. Neuropathy  G62.9          Discharge Medications:  Discharge Medication List as of 2/15/2023  1:32 AM      START taking these medications    Details   gabapentin (NEURONTIN) 100 MG capsule Take 2 capsules (200 mg) by mouth 3 times daily for 20 days, Disp-120 capsule, R-0, E-Prescribe            Scribe Disclosure:  I, Paola Quesada, am serving as a scribe at 1:53 AM on 2/15/2023 to document services personally performed by Vivek Salazar MD based on my observations and the provider's statements to me.     2/15/2023   Vivek Salazar MD Salay, Nicholas J, MD  02/16/23 2044

## 2023-02-15 NOTE — ED TRIAGE NOTES
"Arrives from home. States that she is having \"nerve issue.\" states that she has concerns for a pinched nerve in her neck that is making her bilateral arms numbs, tingling and painful and occasionally burning sensation. States this is been ongoing for a month. States pain is worse at night.     States that this evening had a \"violent whole body, right sided twitch.\" which started her symptoms. States took tylenol without relief, states attempted heat with some relief.     Denies trauma to her neck. States that a couple of months ago states she got cubital tunnel, with chronic pain from repetitive motion.       "

## 2023-03-12 ENCOUNTER — APPOINTMENT (OUTPATIENT)
Dept: ULTRASOUND IMAGING | Facility: CLINIC | Age: 23
End: 2023-03-12
Attending: EMERGENCY MEDICINE
Payer: COMMERCIAL

## 2023-03-12 ENCOUNTER — HOSPITAL ENCOUNTER (EMERGENCY)
Facility: CLINIC | Age: 23
Discharge: HOME | End: 2023-03-13
Attending: EMERGENCY MEDICINE | Admitting: EMERGENCY MEDICINE
Payer: COMMERCIAL

## 2023-03-12 ENCOUNTER — APPOINTMENT (OUTPATIENT)
Dept: CT IMAGING | Facility: CLINIC | Age: 23
End: 2023-03-12
Attending: EMERGENCY MEDICINE
Payer: COMMERCIAL

## 2023-03-12 DIAGNOSIS — N83.202 CYST OF LEFT OVARY: ICD-10-CM

## 2023-03-12 DIAGNOSIS — R10.84 ABDOMINAL PAIN, GENERALIZED: ICD-10-CM

## 2023-03-12 LAB
ALBUMIN UR-MCNC: 10 MG/DL
ANION GAP SERPL CALCULATED.3IONS-SCNC: 11 MMOL/L (ref 7–15)
APPEARANCE UR: CLEAR
BACTERIA #/AREA URNS HPF: ABNORMAL /HPF
BASOPHILS # BLD AUTO: 0 10E3/UL (ref 0–0.2)
BASOPHILS NFR BLD AUTO: 0 %
BILIRUB UR QL STRIP: NEGATIVE
BUN SERPL-MCNC: 8 MG/DL (ref 6–20)
CALCIUM SERPL-MCNC: 9.3 MG/DL (ref 8.6–10)
CHLORIDE SERPL-SCNC: 104 MMOL/L (ref 98–107)
COLOR UR AUTO: YELLOW
CREAT SERPL-MCNC: 0.74 MG/DL (ref 0.51–0.95)
DEPRECATED HCO3 PLAS-SCNC: 22 MMOL/L (ref 22–29)
EOSINOPHIL # BLD AUTO: 0.1 10E3/UL (ref 0–0.7)
EOSINOPHIL NFR BLD AUTO: 1 %
ERYTHROCYTE [DISTWIDTH] IN BLOOD BY AUTOMATED COUNT: 12.6 % (ref 10–15)
GFR SERPL CREATININE-BSD FRML MDRD: >90 ML/MIN/1.73M2
GLUCOSE SERPL-MCNC: 99 MG/DL (ref 70–99)
GLUCOSE UR STRIP-MCNC: NEGATIVE MG/DL
HCG SER QL IA.RAPID: NEGATIVE
HCG SERPL QL: NEGATIVE
HCT VFR BLD AUTO: 37 % (ref 35–47)
HGB BLD-MCNC: 12.5 G/DL (ref 11.7–15.7)
HGB UR QL STRIP: ABNORMAL
HOLD SPECIMEN: NORMAL
HOLD SPECIMEN: NORMAL
IMM GRANULOCYTES # BLD: 0 10E3/UL
IMM GRANULOCYTES NFR BLD: 0 %
KETONES UR STRIP-MCNC: 10 MG/DL
LEUKOCYTE ESTERASE UR QL STRIP: ABNORMAL
LYMPHOCYTES # BLD AUTO: 1.4 10E3/UL (ref 0.8–5.3)
LYMPHOCYTES NFR BLD AUTO: 15 %
MCH RBC QN AUTO: 31.3 PG (ref 26.5–33)
MCHC RBC AUTO-ENTMCNC: 33.8 G/DL (ref 31.5–36.5)
MCV RBC AUTO: 93 FL (ref 78–100)
MONOCYTES # BLD AUTO: 0.6 10E3/UL (ref 0–1.3)
MONOCYTES NFR BLD AUTO: 7 %
MUCOUS THREADS #/AREA URNS LPF: PRESENT /LPF
NEUTROPHILS # BLD AUTO: 7.4 10E3/UL (ref 1.6–8.3)
NEUTROPHILS NFR BLD AUTO: 77 %
NITRATE UR QL: NEGATIVE
NRBC # BLD AUTO: 0 10E3/UL
NRBC BLD AUTO-RTO: 0 /100
PH UR STRIP: 5.5 [PH] (ref 5–7)
PLATELET # BLD AUTO: 220 10E3/UL (ref 150–450)
POTASSIUM SERPL-SCNC: 3.7 MMOL/L (ref 3.4–5.3)
RBC # BLD AUTO: 3.99 10E6/UL (ref 3.8–5.2)
RBC URINE: 12 /HPF
SODIUM SERPL-SCNC: 137 MMOL/L (ref 136–145)
SP GR UR STRIP: 1.03 (ref 1–1.03)
SQUAMOUS EPITHELIAL: 2 /HPF
UROBILINOGEN UR STRIP-MCNC: NORMAL MG/DL
WBC # BLD AUTO: 9.6 10E3/UL (ref 4–11)
WBC URINE: 1 /HPF

## 2023-03-12 PROCEDURE — 96374 THER/PROPH/DIAG INJ IV PUSH: CPT

## 2023-03-12 PROCEDURE — 36415 COLL VENOUS BLD VENIPUNCTURE: CPT | Performed by: EMERGENCY MEDICINE

## 2023-03-12 PROCEDURE — 74177 CT ABD & PELVIS W/CONTRAST: CPT

## 2023-03-12 PROCEDURE — 84703 CHORIONIC GONADOTROPIN ASSAY: CPT

## 2023-03-12 PROCEDURE — 81001 URINALYSIS AUTO W/SCOPE: CPT | Performed by: EMERGENCY MEDICINE

## 2023-03-12 PROCEDURE — 99285 EMERGENCY DEPT VISIT HI MDM: CPT | Mod: 25

## 2023-03-12 PROCEDURE — 80048 BASIC METABOLIC PNL TOTAL CA: CPT | Performed by: EMERGENCY MEDICINE

## 2023-03-12 PROCEDURE — 85025 COMPLETE CBC W/AUTO DIFF WBC: CPT | Performed by: EMERGENCY MEDICINE

## 2023-03-12 PROCEDURE — 250N000011 HC RX IP 250 OP 636: Performed by: EMERGENCY MEDICINE

## 2023-03-12 PROCEDURE — 84703 CHORIONIC GONADOTROPIN ASSAY: CPT | Mod: 91 | Performed by: EMERGENCY MEDICINE

## 2023-03-12 PROCEDURE — 250N000013 HC RX MED GY IP 250 OP 250 PS 637: Performed by: EMERGENCY MEDICINE

## 2023-03-12 PROCEDURE — 76856 US EXAM PELVIC COMPLETE: CPT

## 2023-03-12 PROCEDURE — 250N000009 HC RX 250: Performed by: EMERGENCY MEDICINE

## 2023-03-12 RX ORDER — KETOROLAC TROMETHAMINE 15 MG/ML
15 INJECTION, SOLUTION INTRAMUSCULAR; INTRAVENOUS ONCE
Status: COMPLETED | OUTPATIENT
Start: 2023-03-12 | End: 2023-03-12

## 2023-03-12 RX ORDER — IOPAMIDOL 755 MG/ML
500 INJECTION, SOLUTION INTRAVASCULAR ONCE
Status: COMPLETED | OUTPATIENT
Start: 2023-03-12 | End: 2023-03-12

## 2023-03-12 RX ORDER — ACETAMINOPHEN 500 MG
500 TABLET ORAL EVERY 4 HOURS PRN
Status: DISCONTINUED | OUTPATIENT
Start: 2023-03-12 | End: 2023-03-12

## 2023-03-12 RX ORDER — ACETAMINOPHEN 325 MG/10.15ML
500 LIQUID ORAL ONCE
Status: COMPLETED | OUTPATIENT
Start: 2023-03-12 | End: 2023-03-12

## 2023-03-12 RX ORDER — ONDANSETRON 4 MG/1
4 TABLET, ORALLY DISINTEGRATING ORAL ONCE
Status: COMPLETED | OUTPATIENT
Start: 2023-03-12 | End: 2023-03-12

## 2023-03-12 RX ADMIN — IOPAMIDOL 47 ML: 755 INJECTION, SOLUTION INTRAVENOUS at 21:36

## 2023-03-12 RX ADMIN — KETOROLAC TROMETHAMINE 15 MG: 15 INJECTION, SOLUTION INTRAMUSCULAR; INTRAVENOUS at 23:52

## 2023-03-12 RX ADMIN — ACETAMINOPHEN 500 MG: 325 SOLUTION ORAL at 20:49

## 2023-03-12 RX ADMIN — ONDANSETRON 4 MG: 4 TABLET, ORALLY DISINTEGRATING ORAL at 20:27

## 2023-03-12 RX ADMIN — SODIUM CHLORIDE 52 ML: 9 INJECTION, SOLUTION INTRAVENOUS at 21:36

## 2023-03-12 ASSESSMENT — ACTIVITIES OF DAILY LIVING (ADL)
ADLS_ACUITY_SCORE: 35
ADLS_ACUITY_SCORE: 35

## 2023-03-13 VITALS
RESPIRATION RATE: 18 BRPM | HEIGHT: 68 IN | BODY MASS INDEX: 14.09 KG/M2 | DIASTOLIC BLOOD PRESSURE: 56 MMHG | SYSTOLIC BLOOD PRESSURE: 99 MMHG | WEIGHT: 93 LBS | OXYGEN SATURATION: 96 % | HEART RATE: 74 BPM | TEMPERATURE: 99.9 F

## 2023-03-13 RX ORDER — ONDANSETRON 4 MG/1
4 TABLET, ORALLY DISINTEGRATING ORAL EVERY 8 HOURS PRN
Qty: 10 TABLET | Refills: 0 | Status: SHIPPED | OUTPATIENT
Start: 2023-03-13

## 2023-03-13 RX ORDER — OXYCODONE HYDROCHLORIDE 5 MG/1
5 TABLET ORAL ONCE
Status: COMPLETED | OUTPATIENT
Start: 2023-03-13 | End: 2023-03-13

## 2023-03-13 NOTE — ED PROVIDER NOTES
History     Chief Complaint:  Abdominal Pain       HPI   Jordyn Jackman is a 22 year old female who presents to the emergency department for evaluation of abdominal pain.  Patient reports she woke this morning in her otherwise normal state of health.  Later in the morning, after having a bowel movement, she noted acute onset of lower abdominal pain.  Pain was primarily on the right side, though also involve the left side.  She noted associated symptoms of nausea and vomiting.  Pain has persisted throughout the day, ultimately prompting presentation to the ED.  She acknowledges a history of pain similar to this in the past, which she relates to ovarian cysts as well as painful menses.  She is currently on day 3 of her menstrual cycle, but notes her current symptoms feel different from previous episodes of abdominal pain which typically precede the onset of her menses.  Denies history of ureteral stones.  Pain has been relatively constant since onset.  Notes decreased appetite.      Independent Historian:   Father - They report history of abdominal pain in the past    Review of External Notes: Reviewed telephone encounter with OB/GYN from 3/1/23     ROS:  Review of Systems  See HPI    Allergies:  Penicillins  Prednisone     Medications:    ondansetron (ZOFRAN ODT) 4 MG ODT tab  albuterol (PROAIR HFA/PROVENTIL HFA/VENTOLIN HFA) 108 (90 Base) MCG/ACT inhaler  benzonatate (TESSALON) 100 MG capsule  gabapentin (NEURONTIN) 100 MG capsule  HYDROcodone-acetaminophen 7.5-325 MG/15ML solution  lactobacillus TABS  UNKNOWN TO PATIENT        Past Medical History:    Past Medical History:   Diagnosis Date     Constipation      Iron deficiency        Past Surgical History:    Denies prior abdominal surgeries    Family History:    family history is not on file.    Social History:   reports that she has never smoked. She has never used smokeless tobacco. She reports that she does not currently use alcohol. She reports that she  "does not currently use drugs.  PCP: [facility], [facility]     Physical Exam     Patient Vitals for the past 24 hrs:   BP Temp Temp src Pulse Resp SpO2 Height Weight   03/13/23 0030 -- -- -- -- -- 96 % -- --   03/13/23 0025 -- -- -- 74 -- 96 % -- --   03/13/23 0020 -- -- -- 55 -- 92 % -- --   03/13/23 0015 99/56 -- -- -- -- -- -- --   03/12/23 2115 -- -- -- -- -- 98 % -- --   03/12/23 1950 118/78 99.9  F (37.7  C) Temporal 111 18 95 % 1.727 m (5' 8\") 42.2 kg (93 lb)        Physical Exam  General:   Thin appearing female   Appears uncomfortable on exam  Eyes:   Conjunctiva without injection or scleral icterus  ENT:   Moist mucous membranes   Nares patent   Pinnae normal  Neck:   Full ROM   No stiffness appreciated  Resp:   Lungs CTAB   No crackles, wheezing or audible rubs   Good air movement  CV:    Tachycardic rate, regular rhythm   S1 and S2 present   No murmur, gallop or rub  GI:   BS present   Abdomen soft without distention   Tenderness to palpation to lower abdomen, R and LLQ   No guarding or rebound tenderness  Skin:   Warm, dry, well perfused   No rashes or open wounds on exposed skin  MSK:   Moves all extremities   No focal deformities or swelling  Neuro:   Alert   Answers questions appropriately   Moves all extremities equally   Gait stable  Psych:   Flat affect      Emergency Department Course       Imaging:  US Pelvis Cmpl wo Transvaginal w Abd/Pel Duplex Lmt   Final Result   IMPRESSION:     1.  3 left ovarian lesions as described above most consistent with hemorrhagic cysts. Short-term follow-up ultrasound is recommended in 6-12 weeks.               CT Abdomen Pelvis w Contrast   Final Result   IMPRESSION:    1.  Multiple enlarged bilateral adnexal cysts. Further characterization with pelvic ultrasound recommended.   2.  No findings for acute appendicitis.   3.  No renal calculi or hydronephrosis.         Report per radiology    Laboratory:  Labs Ordered and Resulted from Time of ED Arrival to " Time of ED Departure   ROUTINE UA WITH MICROSCOPIC REFLEX TO CULTURE - Abnormal       Result Value    Color Urine Yellow      Appearance Urine Clear      Glucose Urine Negative      Bilirubin Urine Negative      Ketones Urine 10 (*)     Specific Gravity Urine 1.030      Blood Urine Moderate (*)     pH Urine 5.5      Protein Albumin Urine 10 (*)     Urobilinogen Urine Normal      Nitrite Urine Negative      Leukocyte Esterase Urine Trace (*)     Bacteria Urine Few (*)     Mucus Urine Present (*)     RBC Urine 12 (*)     WBC Urine 1      Squamous Epithelials Urine 2 (*)    BASIC METABOLIC PANEL - Normal    Sodium 137      Potassium 3.7      Chloride 104      Carbon Dioxide (CO2) 22      Anion Gap 11      Urea Nitrogen 8.0      Creatinine 0.74      Calcium 9.3      Glucose 99      GFR Estimate >90     HCG QUALITATIVE PREGNANCY - Normal    hCG Serum Qualitative Negative     ISTAT HCG QUALITATIVE PREGNANCY POCT - Normal    HCG Qualitative POCT Negative     CBC WITH PLATELETS AND DIFFERENTIAL    WBC Count 9.6      RBC Count 3.99      Hemoglobin 12.5      Hematocrit 37.0      MCV 93      MCH 31.3      MCHC 33.8      RDW 12.6      Platelet Count 220      % Neutrophils 77      % Lymphocytes 15      % Monocytes 7      % Eosinophils 1      % Basophils 0      % Immature Granulocytes 0      NRBCs per 100 WBC 0      Absolute Neutrophils 7.4      Absolute Lymphocytes 1.4      Absolute Monocytes 0.6      Absolute Eosinophils 0.1      Absolute Basophils 0.0      Absolute Immature Granulocytes 0.0      Absolute NRBCs 0.0          Emergency Department Course & Assessments:    Interventions:  Medications   ondansetron (ZOFRAN ODT) ODT tab 4 mg (4 mg Oral $Given 3/12/23 2027)   acetaminophen (TYLENOL) solution 500 mg (500 mg Oral $Given 3/12/23 2049)   iopamidol (ISOVUE-370) solution 500 mL (47 mLs Intravenous $Given 3/12/23 2136)   Sodium Chloride for CT Scan Flush Use (52 mLs Intravenous $Given 3/12/23 2136)   ketorolac (TORADOL)  injection 15 mg (15 mg Intravenous $Given 3/12/23 3639)   oxyCODONE (ROXICODONE) tablet 5 mg (5 mg Oral Not Given 3/13/23 0021)        Assessments:  See below    Independent Interpretation (X-rays, CTs, rhythm strip):  None    Consultations/Discussion of Management or Tests:  Discussed with Dr. Mendoza from Ob/GYN who has reviewed US findings.   ED Course as of 03/13/23 1053   Sun Mar 12, 2023   2152 Patient re-evaluated.  CT results reviewed.  Will plan for US.  Arrangements made for expedited US.  Declines offer for additional analgesia at this time.   Mon Mar 13, 2023   0006 Spoke with Dr. Mendoza from [facility].   0037 Patient re-assessed.  Plan of care and disposition options discussed.       Social Determinants of Health affecting care:   None    Disposition:  The patient was discharged to home.     Impression & Plan        Medical Decision Making:  Jordyn Jackman is a 22-year-old female presenting to the emergency department for evaluation of abdominal pain.  History obtained from patient and supplemented by father present at bedside.  VS on presentation reveal HR of 111, temperature of 99.9, though otherwise are unremarkable.  DDx is broad, including but was not limited to, ovarian cyst, tubo-ovarian abscess, ovarian torsion, ectopic pregnancy, acute appendicitis, ureteral colic, pyelonephritis/UTI, PID, among others.  Work-up included advanced imaging and laboratory studies.  At present, I suspect patient's symptoms are likely secondary to underlying ovarian cyst.  Initial imaging included CT of the abdomen/pelvis.  This demonstrates multiple large cysts in the right and left pelvis measuring up to 5.3 x 3.4 cm without significant pelvic free fluid.  Appendix is not clearly visualized, though no other inflammatory changes are noted in the right lower quadrant that would raise suspicion for appendicitis.  No evidence of ureteral stone or hydronephrosis.  Subsequent pelvic ultrasound demonstrates 3  ovarian lesions from the left ovary measuring 3.2, 3.2, and 3.8 cm in maximal dimension.  Some of the cystic features are suspicious for hemorrhagic cyst versus endometrioma.  Arterial and venous flow is identified bilaterally, as well as a small amount of free pelvic fluid.  Certainly, hemorrhagic cyst may account for patient's discomfort, though ovarian torsion was also considered.  I reviewed the case and examination/imaging findings with Dr. Mata from OB/GYN who has graciously reviewed patient's ultrasound imaging.  At this time, she feels this is less likely to represent ovarian torsion or emergent surgical intervention, the recommended symptom control.  She acknowledged the rare possibility of torsion with the presence of blood flow, yet given high suspicion for hemorrhagic cyst, again did not feel emergent surgical intervention warranted.  Other etiologies considered though felt to be less likely.  Urinalysis demonstrates hematuria, the patient currently on menstrual cycle likely accounting for this finding.  Pregnancy test returned negative.  Discussed disposition options with patient including hospital observation for symptom control/close monitoring of symptoms versus discharge home with supportive care.  Patient and father feel very comfortable discharging home at this time which I feel to be reasonable as well.  Following the above interventions, her symptoms are improved.  Will discharge home with oral Zofran to assist with oral intake.  We discussed that work-up today does not preclude the possibility of ovarian torsion in the future and patient is understanding that she needs to return immediately to the ED should she develop any recurrent episodes of pain, vomiting, or any other new or troubling symptoms.  This diagnostic entity was discussed in detail.  Patient and father are in agreement with outlined plan of care.  All of their questions have been answered prior to discharge.  I recommended  close outpatient OB/GYN follow-up and they will contact their clinic tomorrow.  They are understanding that she will require repeat ultrasound in the future to ensure resolution of the above cystic findings.    Diagnosis:    ICD-10-CM    1. Abdominal pain, generalized  R10.84       2. Cyst of left ovary  N83.202            Discharge Medications:  Discharge Medication List as of 3/13/2023 12:46 AM      START taking these medications    Details   ondansetron (ZOFRAN ODT) 4 MG ODT tab Take 1 tablet (4 mg) by mouth every 8 hours as needed for nausea, Disp-10 tablet, R-0, E-Prescribe            3/12/2023   Roberto Carlos Burns MD Roach, Brian Donald, MD  03/13/23 1101

## 2023-03-13 NOTE — ED TRIAGE NOTES
Here for right abdominal pain started this morning associated n/v. Tried heat and ice pack but not helping. ABCs intact.     Triage Assessment     Row Name 03/12/23 1949       Triage Assessment (Adult)    Airway WDL WDL       Respiratory WDL    Respiratory WDL WDL       Cardiac WDL    Cardiac WDL WDL

## 2023-03-13 NOTE — DISCHARGE INSTRUCTIONS
Please follow-up with OB/GYN provider in 1 to 2 days for recheck.  Continue with Tylenol/ibuprofen as needed for pain.  Zofran can be used for nausea.  Ultimately, you will require repeat US of your ovary to ensure resolution.    We discussed the possibility of hemorrhagic cyst versus ovarian torsion.  Return to the ER if you develop worsening pain, vomiting, or any other new or troubling symptoms.

## 2023-03-13 NOTE — ED NOTES
Pt refused oral oxycodone. Pt states she does not want to take any narcotics. After Toradol, pain has decreased from 9/10 to 6/10. The patients pain goal is 4/10. MD updated.

## 2023-03-13 NOTE — ED PROVIDER NOTES
Rapid Assessment Note    History:   Jordyn Jackman is a 22 year old female who presents with abdominal pain.  Patient reports symptoms began earlier today.  Pain located to the right side and lower abdomen.  Notes associated nausea, vomiting, and decreased appetite.  Pain is been relatively constant.  Notes a history of painful menstrual cycles as well as ovarian cyst, though states current pain feels different from that, and timing of pain is unusual when compared with previous episodes of ovarian cysts.  No prior abdominal surgeries.  Denies history of urinary stones.    Exam:   General:  Alert, interactive  Cardiovascular:  Well perfused  Lungs:  No respiratory distress, no accessory muscle use  Neuro:  Moving all 4 extremities  Skin:  Warm, dry  Psych:  Normal affect  GI: Tenderness to palpation of right mid/lower abdomen    Plan of Care:   I evaluated the patient and developed an initial plan of care. I discussed this plan and explained that I, or one of my partners, would be returning to complete the evaluation.     3/12/2023  [employer]    Portions of this medical record were completed by a scribe. UPON MY REVIEW AND AUTHENTICATION BY ELECTRONIC SIGNATURE, this confirms (a) I performed the applicable clinical services, and (b) the record is accurate.        Roberto Carlos Burns MD  03/12/23 2016

## 2023-06-22 ENCOUNTER — APPOINTMENT (OUTPATIENT)
Dept: CT IMAGING | Facility: CLINIC | Age: 23
End: 2023-06-22
Attending: EMERGENCY MEDICINE
Payer: COMMERCIAL

## 2023-06-22 ENCOUNTER — HOSPITAL ENCOUNTER (EMERGENCY)
Facility: CLINIC | Age: 23
Discharge: HOME | End: 2023-06-22
Attending: EMERGENCY MEDICINE | Admitting: EMERGENCY MEDICINE
Payer: COMMERCIAL

## 2023-06-22 VITALS
DIASTOLIC BLOOD PRESSURE: 63 MMHG | WEIGHT: 92.59 LBS | OXYGEN SATURATION: 99 % | RESPIRATION RATE: 14 BRPM | SYSTOLIC BLOOD PRESSURE: 114 MMHG | TEMPERATURE: 97.2 F | BODY MASS INDEX: 14.08 KG/M2 | HEART RATE: 71 BPM

## 2023-06-22 DIAGNOSIS — G89.18 POST-OP PAIN: ICD-10-CM

## 2023-06-22 LAB
ANION GAP SERPL CALCULATED.3IONS-SCNC: 12 MMOL/L (ref 7–15)
BASOPHILS # BLD AUTO: 0 10E3/UL (ref 0–0.2)
BASOPHILS NFR BLD AUTO: 1 %
BUN SERPL-MCNC: 8.6 MG/DL (ref 6–20)
CALCIUM SERPL-MCNC: 9.3 MG/DL (ref 8.6–10)
CHLORIDE SERPL-SCNC: 103 MMOL/L (ref 98–107)
CREAT SERPL-MCNC: 0.8 MG/DL (ref 0.51–0.95)
DEPRECATED HCO3 PLAS-SCNC: 22 MMOL/L (ref 22–29)
EOSINOPHIL # BLD AUTO: 0.1 10E3/UL (ref 0–0.7)
EOSINOPHIL NFR BLD AUTO: 2 %
ERYTHROCYTE [DISTWIDTH] IN BLOOD BY AUTOMATED COUNT: 12.8 % (ref 10–15)
GFR SERPL CREATININE-BSD FRML MDRD: >90 ML/MIN/1.73M2
GLUCOSE SERPL-MCNC: 93 MG/DL (ref 70–99)
HCG SERPL QL: NEGATIVE
HCT VFR BLD AUTO: 37.1 % (ref 35–47)
HGB BLD-MCNC: 12.2 G/DL (ref 11.7–15.7)
HOLD SPECIMEN: NORMAL
HOLD SPECIMEN: NORMAL
IMM GRANULOCYTES # BLD: 0 10E3/UL
IMM GRANULOCYTES NFR BLD: 0 %
LYMPHOCYTES # BLD AUTO: 1.9 10E3/UL (ref 0.8–5.3)
LYMPHOCYTES NFR BLD AUTO: 30 %
MCH RBC QN AUTO: 30.4 PG (ref 26.5–33)
MCHC RBC AUTO-ENTMCNC: 32.9 G/DL (ref 31.5–36.5)
MCV RBC AUTO: 93 FL (ref 78–100)
MONOCYTES # BLD AUTO: 0.4 10E3/UL (ref 0–1.3)
MONOCYTES NFR BLD AUTO: 7 %
NEUTROPHILS # BLD AUTO: 3.7 10E3/UL (ref 1.6–8.3)
NEUTROPHILS NFR BLD AUTO: 60 %
NRBC # BLD AUTO: 0 10E3/UL
NRBC BLD AUTO-RTO: 0 /100
PLATELET # BLD AUTO: 218 10E3/UL (ref 150–450)
POTASSIUM SERPL-SCNC: 3.9 MMOL/L (ref 3.4–5.3)
RBC # BLD AUTO: 4.01 10E6/UL (ref 3.8–5.2)
SODIUM SERPL-SCNC: 137 MMOL/L (ref 136–145)
TROPONIN T SERPL HS-MCNC: <6 NG/L
WBC # BLD AUTO: 6.2 10E3/UL (ref 4–11)

## 2023-06-22 PROCEDURE — 250N000011 HC RX IP 250 OP 636: Performed by: EMERGENCY MEDICINE

## 2023-06-22 PROCEDURE — 80048 BASIC METABOLIC PNL TOTAL CA: CPT | Performed by: EMERGENCY MEDICINE

## 2023-06-22 PROCEDURE — 250N000013 HC RX MED GY IP 250 OP 250 PS 637: Performed by: EMERGENCY MEDICINE

## 2023-06-22 PROCEDURE — 71275 CT ANGIOGRAPHY CHEST: CPT

## 2023-06-22 PROCEDURE — 84484 ASSAY OF TROPONIN QUANT: CPT | Performed by: EMERGENCY MEDICINE

## 2023-06-22 PROCEDURE — 258N000003 HC RX IP 258 OP 636: Performed by: EMERGENCY MEDICINE

## 2023-06-22 PROCEDURE — 36415 COLL VENOUS BLD VENIPUNCTURE: CPT | Performed by: STUDENT IN AN ORGANIZED HEALTH CARE EDUCATION/TRAINING PROGRAM

## 2023-06-22 PROCEDURE — 85025 COMPLETE CBC W/AUTO DIFF WBC: CPT | Performed by: EMERGENCY MEDICINE

## 2023-06-22 PROCEDURE — 99285 EMERGENCY DEPT VISIT HI MDM: CPT | Mod: 25

## 2023-06-22 PROCEDURE — 250N000009 HC RX 250: Performed by: EMERGENCY MEDICINE

## 2023-06-22 PROCEDURE — 93005 ELECTROCARDIOGRAM TRACING: CPT

## 2023-06-22 PROCEDURE — 85004 AUTOMATED DIFF WBC COUNT: CPT | Performed by: STUDENT IN AN ORGANIZED HEALTH CARE EDUCATION/TRAINING PROGRAM

## 2023-06-22 PROCEDURE — 96360 HYDRATION IV INFUSION INIT: CPT | Mod: 59

## 2023-06-22 PROCEDURE — 84703 CHORIONIC GONADOTROPIN ASSAY: CPT | Performed by: EMERGENCY MEDICINE

## 2023-06-22 RX ORDER — IOPAMIDOL 755 MG/ML
120 INJECTION, SOLUTION INTRAVASCULAR ONCE
Status: COMPLETED | OUTPATIENT
Start: 2023-06-22 | End: 2023-06-22

## 2023-06-22 RX ORDER — ACETAMINOPHEN 500 MG
500 TABLET ORAL EVERY 4 HOURS PRN
Status: DISCONTINUED | OUTPATIENT
Start: 2023-06-22 | End: 2023-06-22 | Stop reason: HOSPADM

## 2023-06-22 RX ORDER — ACETAMINOPHEN 325 MG/10.15ML
500 LIQUID ORAL ONCE
Status: COMPLETED | OUTPATIENT
Start: 2023-06-22 | End: 2023-06-22

## 2023-06-22 RX ADMIN — SODIUM CHLORIDE 1000 ML: 9 INJECTION, SOLUTION INTRAVENOUS at 19:19

## 2023-06-22 RX ADMIN — IOPAMIDOL 51 ML: 755 INJECTION, SOLUTION INTRAVENOUS at 18:40

## 2023-06-22 RX ADMIN — SODIUM CHLORIDE 76 ML: 9 INJECTION, SOLUTION INTRAVENOUS at 18:42

## 2023-06-22 RX ADMIN — ACETAMINOPHEN 500 MG: 325 SOLUTION ORAL at 19:40

## 2023-06-22 ASSESSMENT — ACTIVITIES OF DAILY LIVING (ADL): ADLS_ACUITY_SCORE: 35

## 2023-06-22 NOTE — ED TRIAGE NOTES
Pt arrives with c/o chest pain and SOB. Pt had laparoscopic surgery on 6/20. Pt was seen at  and had an elevated dimer of 0.99 and a normal cxr and referred to the ER.      Triage Assessment     Row Name 06/22/23 9683       Triage Assessment (Adult)    Airway WDL WDL       Respiratory WDL    Respiratory WDL X;rhythm/pattern    Rhythm/Pattern, Respiratory shortness of breath;dyspnea on exertion       Cardiac WDL    Cardiac WDL X;chest pain       Peripheral/Neurovascular WDL    Peripheral Neurovascular WDL WDL       Cognitive/Neuro/Behavioral WDL    Cognitive/Neuro/Behavioral WDL WDL

## 2023-06-22 NOTE — ED PROVIDER NOTES
History     Chief Complaint:  Shortness of Breath and Chest Pain       The history is provided by the patient and a parent.      Jordyn Jackman is a 23 year old female who presents to the ED with her father for evaluation of shortness of breath and chest pain. Patient presented to Urgent Care today for chest pain and shortness of breath. She received an x-ray and d-dimer at Urgent Care and was suggested to ED due to elevated dimer of 0.99. Patient had laparoscopic surgery 2 days ago with painful recovery. Her dad notes she initially had bilateral shoulder pain after surgery that alleviated, and now has chest pain. Patient denies leg swelling or tenderness. No nausea, little appetite. No history of blood clots. She notes left incision site has bled a couple of times since surgery.    Independent Historian:   Father - They report as noted above    Review of External Notes:   None    Medications:    Albuterol  Tessalon  Neurontin  Zofran  Tylenol    Past Medical History:    Iron deficiency  Anxiety  Underweight since birth  IBS  Dysmenorrhea    Past Surgical History:    Tonsillectomy  [place] teeth extraction  Oral surgery    Physical Exam     Patient Vitals for the past 24 hrs:   BP Temp Temp src Pulse Resp SpO2 Weight   06/22/23 1945 114/63 -- -- 71 14 99 % --   06/22/23 1919 118/72 -- -- -- -- 97 % --   06/22/23 1904 123/74 -- -- -- -- 96 % --   06/22/23 1834 105/66 -- -- -- -- 97 % --   06/22/23 1815 111/68 -- -- 65 -- 98 % --   06/22/23 1801 109/72 -- -- 75 -- 100 % --   06/22/23 1756 110/72 -- -- 75 -- 99 % --   06/22/23 1720 116/62 97.2  F (36.2  C) Temporal 82 18 97 % 42 kg (92 lb 9.5 oz)     Physical Exam  Constitutional: Vital signs reviewed as above  General: Alert, uncomfortable-appearing.    HEENT: Moist mucous membranes  Eyes: Pupils are equal, round, and reactive to light.   Neck: Normal range of motion  Cardiovascular: normal rate, Regular rhythm and normal heart sounds.  No MRG  Pulmonary/Chest:  Effort normal and breath sounds normal. No respiratory distress. Patient has no wheezes. Patient has no rales.  No chest wall tenderness.  Gastrointestinal: Soft. Positive bowel sounds. No MRG.  Musculoskeletal/Extremities: Full ROM. No calf swelling or tenderness. Well-healing incision sites in abdomen.   Endo: No pitting edema  Neurological: Alert, no focal deficits.  Skin: Skin is warm and dry.   Psychiatric: Anxious    Emergency Department Course   ECG  ECG taken at 1740, ECG read at 1915  Normal sinus rhythm  Lateral infarct, age undetermined  Abnormal ECG  Rate 75 bpm. VT interval 116 ms. QRS duration 84 ms. QT/QTc 374/417 ms. P-R-T axes * -25 147.      Imaging:  CT Chest Pulmonary Embolism w Contrast   Final Result   IMPRESSION:   1.  No evidence of pulmonary embolus.   2.  Intra-abdominal free air consistent with history of recent prior surgery.         Report per radiology    Laboratory:  Labs Ordered and Resulted from Time of ED Arrival to Time of ED Departure   HCG QUALITATIVE PREGNANCY - Normal       Result Value    hCG Serum Qualitative Negative     BASIC METABOLIC PANEL - Normal    Sodium 137      Potassium 3.9      Chloride 103      Carbon Dioxide (CO2) 22      Anion Gap 12      Urea Nitrogen 8.6      Creatinine 0.80      Calcium 9.3      Glucose 93      GFR Estimate >90     TROPONIN T, HIGH SENSITIVITY - Normal    Troponin T, High Sensitivity <6     CBC WITH PLATELETS AND DIFFERENTIAL    WBC Count 6.2      RBC Count 4.01      Hemoglobin 12.2      Hematocrit 37.1      MCV 93      MCH 30.4      MCHC 32.9      RDW 12.8      Platelet Count 218      % Neutrophils 60      % Lymphocytes 30      % Monocytes 7      % Eosinophils 2      % Basophils 1      % Immature Granulocytes 0      NRBCs per 100 WBC 0      Absolute Neutrophils 3.7      Absolute Lymphocytes 1.9      Absolute Monocytes 0.4      Absolute Eosinophils 0.1      Absolute Basophils 0.0      Absolute Immature Granulocytes 0.0      Absolute NRBCs  0.0       Emergency Department Course & Assessments:    Interventions:  Medications   acetaminophen (TYLENOL) tablet 500 mg (has no administration in time range)   0.9% sodium chloride BOLUS (0 mLs Intravenous Stopped 6/22/23 2025)   iopamidol (ISOVUE-370) solution 120 mL (51 mLs Intravenous $Given 6/22/23 1840)   Saline CT scan flush (76 mLs Intravenous $Given 6/22/23 1842)   acetaminophen (TYLENOL) solution 500 mg (500 mg Oral $Given 6/22/23 1940)     Assessments:  1824 I obtained history and examined the patient as noted above.  1915 I rechecked the patient and explained findings. We discussed plans for discharge after ED medical interventions and the patient is comfortable with this plan.  1953 I rechecked the patient.     Independent Interpretation (X-rays, CTs, rhythm strip):  CT was negative for any evidence of PE per my interpretation    Consultations/Discussion of Management or Tests:  None     Social Determinants of Health affecting care:   None    Disposition:  The patient was discharged to home.     Impression & Plan    CMS Diagnoses: None    Medical Decision Making:  Patient presents 2 days after having a laparoscopic cholecystectomy and now has some sharp pain in her shoulders and chest with some shortness of breath.  She was seen at urgent care and had an elevated D-dimer was sent here to rule out PE.  This pain certainly could be secondary to insufflation from her laparoscopic procedure.  However given her elevated D-dimer and her chest pain we did obtain a CTA.  [place]une this was negative for PE but does show some free air consistent with insufflation.  Her troponin and basic labs are otherwise unremarkable.  She did receive some fluids and Tylenol here.  I did offer stronger pain medications but she declined.  She will continue with Tylenol hydration and increasing her caloric intake as she is feeling very weak after the surgery.  Both she and her father were in agreement that she was discharged  home.    Diagnosis:    ICD-10-CM    1. Post-op pain  G89.18            Discharge Medications:  New Prescriptions    No medications on file          Scribe Disclosure:  I, Allison Zazueta, am serving as a scribe at 6:32 PM on 6/22/2023 to document services personally performed by Edy Gonzalez MD based on my observations and the provider's statements to me.   6/22/2023   Edy Gonzalez MD Walters, Brent Aaron, MD  06/22/23 2032       Edy Gonzalez MD  06/22/23 2033

## 2023-06-23 LAB
ATRIAL RATE - MUSE: 75 BPM
DIASTOLIC BLOOD PRESSURE - MUSE: NORMAL MMHG
INTERPRETATION ECG - MUSE: NORMAL
P AXIS - MUSE: NORMAL DEGREES
PR INTERVAL - MUSE: 116 MS
QRS DURATION - MUSE: 84 MS
QT - MUSE: 374 MS
QTC - MUSE: 417 MS
R AXIS - MUSE: -25 DEGREES
SYSTOLIC BLOOD PRESSURE - MUSE: NORMAL MMHG
T AXIS - MUSE: 147 DEGREES
VENTRICULAR RATE- MUSE: 75 BPM

## 2023-06-23 NOTE — ED NOTES
Assisted Pt to commode with assistance. Applied monitor equipment to Pt (BP cuff and pulse ox). Pt complained of being cold, warm blanket brought to pt.

## 2023-11-09 ENCOUNTER — APPOINTMENT (OUTPATIENT)
Dept: GENERAL RADIOLOGY | Facility: CLINIC | Age: 23
End: 2023-11-09
Attending: EMERGENCY MEDICINE
Payer: COMMERCIAL

## 2023-11-09 ENCOUNTER — HOSPITAL ENCOUNTER (EMERGENCY)
Facility: CLINIC | Age: 23
Discharge: HOME | End: 2023-11-09
Attending: EMERGENCY MEDICINE | Admitting: EMERGENCY MEDICINE
Payer: COMMERCIAL

## 2023-11-09 VITALS
RESPIRATION RATE: 18 BRPM | HEART RATE: 81 BPM | WEIGHT: 92.37 LBS | TEMPERATURE: 98.7 F | BODY MASS INDEX: 14.05 KG/M2 | SYSTOLIC BLOOD PRESSURE: 118 MMHG | DIASTOLIC BLOOD PRESSURE: 81 MMHG | OXYGEN SATURATION: 99 %

## 2023-11-09 DIAGNOSIS — M25.511 ACUTE PAIN OF RIGHT SHOULDER: ICD-10-CM

## 2023-11-09 DIAGNOSIS — M89.8X1 PAIN OF RIGHT CLAVICLE: ICD-10-CM

## 2023-11-09 PROCEDURE — 73030 X-RAY EXAM OF SHOULDER: CPT | Mod: RT

## 2023-11-09 PROCEDURE — 99283 EMERGENCY DEPT VISIT LOW MDM: CPT

## 2023-11-09 PROCEDURE — 250N000013 HC RX MED GY IP 250 OP 250 PS 637: Performed by: EMERGENCY MEDICINE

## 2023-11-09 RX ORDER — IBUPROFEN 100 MG/5ML
10 SUSPENSION, ORAL (FINAL DOSE FORM) ORAL ONCE
Status: COMPLETED | OUTPATIENT
Start: 2023-11-09 | End: 2023-11-09

## 2023-11-09 RX ADMIN — IBUPROFEN 400 MG: 100 SUSPENSION ORAL at 21:51

## 2023-11-09 ASSESSMENT — ACTIVITIES OF DAILY LIVING (ADL): ADLS_ACUITY_SCORE: 35

## 2023-11-10 NOTE — ED TRIAGE NOTES
"Patient reports leaning forward this evening when she heard a \"click\" in her right clavicle/shoulder area.  ABCs intact, A&Ox4.     Triage Assessment (Adult)       Row Name 11/09/23 1677          Triage Assessment    Airway WDL WDL        Respiratory WDL    Respiratory WDL WDL        Skin Circulation/Temperature WDL    Skin Circulation/Temperature WDL WDL        Cardiac WDL    Cardiac WDL WDL        Peripheral/Neurovascular WDL    Peripheral Neurovascular WDL WDL        Cognitive/Neuro/Behavioral WDL    Cognitive/Neuro/Behavioral WDL WDL                     "

## 2023-11-10 NOTE — DISCHARGE INSTRUCTIONS
What do you do next:   Continue your home medications unless we have specifically changed them  If medications were prescribed today, take these as directed.  You can use over-the-counter acetaminophen (Tylenol ) and ibuprofen for fever or pain control as applicable to your visit today.  Acetaminophen (Tylenol): Take 500 to 1000 mg by mouth every 6 hours as needed for fever or pain.  Do not take more than 4000 total milligrams of acetaminophen-containing products in a 24-hour timeframe.  Ibuprofen: Take 600 milligrams by mouth every 6-8 hours as needed for fever or pain.  Take this with food or milk to avoid stomach upset.  Follow up as indicated below    When do you return: If you have uncontrolled pain, or any other symptoms that concern you, please return to the ED for reevaluation.    Thank you for allowing us to care for you today.

## 2023-11-10 NOTE — ED PROVIDER NOTES
History     Chief Complaint:  Clavicle Injury    The history is provided by the patient.      Jordyn Jackman is a 23 year old female who presents with right clavicle pain since 2030 today. She was leaning over when she heard a click and the pain began. Her pain is worse with movement. Denies shortness of breath.    Independent Historian:    None - Patient Only    Review of External Notes:  None    Allergies:  Penicillins  Prednisone     Physical Exam   Patient Vitals for the past 24 hrs:   BP Temp Temp src Pulse Resp SpO2 Weight   11/09/23 2105 (!) 128/97 98.7  F (37.1  C) Temporal 91 18 98 % 41.9 kg (92 lb 6 oz)     Physical Exam    HEENT:  mmm. Normal phonation.  Eyes: PERRL B/L  CV: Peripheral pulses in tact and regular  Resp: Speaking in full sentences without any respiratory distress   Musculoskeletal:  RUE    No right clavicular deformity noted.  There is distal clavicular tenderness, tenderness at the right AC joint, and mild tenderness of the right shoulder.    No gross signs of right shoulder dislocation noted.  Neuro: Alert, no gross motor or sensory deficits  Psych: Calm    Emergency Department Course       Laboratory: Imaging:   Labs Ordered and Resulted from Time of ED Arrival to Time of ED Departure - No data to display  XR Shoulder Right G/E 3 Views   Final Result   IMPRESSION: Normal joint spaces and alignment. No fracture.              Procedures   None    Emergency Department Course & Assessments:       Interventions:  Medications   ibuprofen (ADVIL/MOTRIN) suspension 400 mg (400 mg Oral $Given 11/9/23 2151)     Assessments, Independent Interpretation, Consult/Discussion of ManagementTests:  ED Course as of 11/09/23 2306   Thu Nov 09, 2023 2145 I obtained history and examined the patient as noted above.    2216 XR Shoulder Right G/E 3 Views  My interpretation: No gross Fx or D/L   2237 Rechecked and updated.     Social Determinants of Health affecting care:  None    Disposition:  The patient  was discharged to home.     Impression & Plan    CMS Diagnoses: None    Code Status: No Order    Medical Decision Making:    This 23 year old female presents to the ED due to Clavicle Injury  . Please see the HPI and exam for specifics. A broad differential was considered including, but not limited to, fracture, dislocation, etc.    Based on the differential, exam, and any decision tools, the above workup was undertaken. Lab and/or imaging results were reviewed by me and are notable for no fracture or dislocation of the clavicle or shoulder.    Management of these findings included ibuprofen.    The exact etiology of symptoms at this time is unclear though it is possible the patient could have dislocated and relocated something in her shoulder.  I think that placement in a sling, RICE treatment, and Tylenol/ibuprofen along with orthopedics and primary care follow-up is a reasonable next step.    Critical Care:  None.    Diagnosis:    ICD-10-CM    1. Acute pain of right shoulder  M25.511 Neck/Shoulder/Back Order Sling; Right      2. Pain of right clavicle  M89.8X1 Neck/Shoulder/Back Order Sling; Right           Discharge Medications:  New Prescriptions    No medications on file          11/9/2023   Kevyn De Jesus DO Burns, Bradley Joseph, DO  11/09/23 3336

## 2024-11-20 ENCOUNTER — HOSPITAL ENCOUNTER (EMERGENCY)
Facility: CLINIC | Age: 24
Discharge: HOME | End: 2024-11-20
Attending: EMERGENCY MEDICINE | Admitting: EMERGENCY MEDICINE
Payer: COMMERCIAL

## 2024-11-20 VITALS
DIASTOLIC BLOOD PRESSURE: 75 MMHG | HEART RATE: 69 BPM | HEIGHT: 68 IN | WEIGHT: 95.46 LBS | SYSTOLIC BLOOD PRESSURE: 103 MMHG | BODY MASS INDEX: 14.47 KG/M2 | RESPIRATION RATE: 18 BRPM | TEMPERATURE: 98 F | OXYGEN SATURATION: 98 %

## 2024-11-20 DIAGNOSIS — R10.31 RLQ ABDOMINAL PAIN: ICD-10-CM

## 2024-11-20 DIAGNOSIS — N80.9 ENDOMETRIOSIS: ICD-10-CM

## 2024-11-20 LAB
ALBUMIN SERPL BCG-MCNC: 4.4 G/DL (ref 3.5–5.2)
ALBUMIN UR-MCNC: 10 MG/DL
ALP SERPL-CCNC: 76 U/L (ref 40–150)
ALT SERPL W P-5'-P-CCNC: 8 U/L (ref 0–50)
ANION GAP SERPL CALCULATED.3IONS-SCNC: 14 MMOL/L (ref 7–15)
APPEARANCE UR: CLEAR
AST SERPL W P-5'-P-CCNC: 16 U/L (ref 0–45)
BACTERIA #/AREA URNS HPF: ABNORMAL /HPF
BASOPHILS # BLD AUTO: 0 10E3/UL (ref 0–0.2)
BASOPHILS NFR BLD AUTO: 1 %
BILIRUB SERPL-MCNC: 0.2 MG/DL
BILIRUB UR QL STRIP: NEGATIVE
BUN SERPL-MCNC: 10.2 MG/DL (ref 6–20)
CALCIUM SERPL-MCNC: 9.2 MG/DL (ref 8.8–10.4)
CHLORIDE SERPL-SCNC: 102 MMOL/L (ref 98–107)
COLOR UR AUTO: ABNORMAL
CREAT SERPL-MCNC: 0.76 MG/DL (ref 0.51–0.95)
EGFRCR SERPLBLD CKD-EPI 2021: >90 ML/MIN/1.73M2
EOSINOPHIL # BLD AUTO: 0.1 10E3/UL (ref 0–0.7)
EOSINOPHIL NFR BLD AUTO: 1 %
ERYTHROCYTE [DISTWIDTH] IN BLOOD BY AUTOMATED COUNT: 12.5 % (ref 10–15)
GLUCOSE SERPL-MCNC: 91 MG/DL (ref 70–99)
GLUCOSE UR STRIP-MCNC: NEGATIVE MG/DL
HCG SERPL QL: NEGATIVE
HCG UR QL: NEGATIVE
HCO3 SERPL-SCNC: 20 MMOL/L (ref 22–29)
HCT VFR BLD AUTO: 38.8 % (ref 35–47)
HGB BLD-MCNC: 12.7 G/DL (ref 11.7–15.7)
HGB UR QL STRIP: ABNORMAL
IMM GRANULOCYTES # BLD: 0 10E3/UL
IMM GRANULOCYTES NFR BLD: 0 %
KETONES UR STRIP-MCNC: NEGATIVE MG/DL
LEUKOCYTE ESTERASE UR QL STRIP: NEGATIVE
LIPASE SERPL-CCNC: 37 U/L (ref 13–60)
LYMPHOCYTES # BLD AUTO: 2 10E3/UL (ref 0.8–5.3)
LYMPHOCYTES NFR BLD AUTO: 32 %
MCH RBC QN AUTO: 29.8 PG (ref 26.5–33)
MCHC RBC AUTO-ENTMCNC: 32.7 G/DL (ref 31.5–36.5)
MCV RBC AUTO: 91 FL (ref 78–100)
MONOCYTES # BLD AUTO: 0.5 10E3/UL (ref 0–1.3)
MONOCYTES NFR BLD AUTO: 8 %
MUCOUS THREADS #/AREA URNS LPF: PRESENT /LPF
NEUTROPHILS # BLD AUTO: 3.7 10E3/UL (ref 1.6–8.3)
NEUTROPHILS NFR BLD AUTO: 58 %
NITRATE UR QL: NEGATIVE
NRBC # BLD AUTO: 0 10E3/UL
NRBC BLD AUTO-RTO: 0 /100
PH UR STRIP: 5.5 [PH] (ref 5–7)
PLATELET # BLD AUTO: 273 10E3/UL (ref 150–450)
POTASSIUM SERPL-SCNC: 3.8 MMOL/L (ref 3.4–5.3)
PROT SERPL-MCNC: 7.9 G/DL (ref 6.4–8.3)
RBC # BLD AUTO: 4.26 10E6/UL (ref 3.8–5.2)
RBC URINE: 1 /HPF
SODIUM SERPL-SCNC: 136 MMOL/L (ref 135–145)
SP GR UR STRIP: 1.03 (ref 1–1.03)
SQUAMOUS EPITHELIAL: 1 /HPF
UROBILINOGEN UR STRIP-MCNC: NORMAL MG/DL
WBC # BLD AUTO: 6.3 10E3/UL (ref 4–11)
WBC URINE: 1 /HPF

## 2024-11-20 PROCEDURE — 81025 URINE PREGNANCY TEST: CPT | Performed by: EMERGENCY MEDICINE

## 2024-11-20 PROCEDURE — 36415 COLL VENOUS BLD VENIPUNCTURE: CPT | Performed by: EMERGENCY MEDICINE

## 2024-11-20 PROCEDURE — 80053 COMPREHEN METABOLIC PANEL: CPT | Performed by: EMERGENCY MEDICINE

## 2024-11-20 PROCEDURE — 82040 ASSAY OF SERUM ALBUMIN: CPT | Performed by: EMERGENCY MEDICINE

## 2024-11-20 PROCEDURE — 99285 EMERGENCY DEPT VISIT HI MDM: CPT | Mod: 25

## 2024-11-20 PROCEDURE — 250N000011 HC RX IP 250 OP 636: Performed by: EMERGENCY MEDICINE

## 2024-11-20 PROCEDURE — 84703 CHORIONIC GONADOTROPIN ASSAY: CPT | Performed by: EMERGENCY MEDICINE

## 2024-11-20 PROCEDURE — 250N000009 HC RX 250: Performed by: EMERGENCY MEDICINE

## 2024-11-20 PROCEDURE — 85004 AUTOMATED DIFF WBC COUNT: CPT | Performed by: EMERGENCY MEDICINE

## 2024-11-20 PROCEDURE — 81001 URINALYSIS AUTO W/SCOPE: CPT | Performed by: EMERGENCY MEDICINE

## 2024-11-20 PROCEDURE — 96374 THER/PROPH/DIAG INJ IV PUSH: CPT | Mod: 59

## 2024-11-20 PROCEDURE — 85041 AUTOMATED RBC COUNT: CPT | Performed by: EMERGENCY MEDICINE

## 2024-11-20 PROCEDURE — 83690 ASSAY OF LIPASE: CPT | Performed by: EMERGENCY MEDICINE

## 2024-11-20 RX ORDER — IOPAMIDOL 755 MG/ML
500 INJECTION, SOLUTION INTRAVASCULAR ONCE
Status: COMPLETED | OUTPATIENT
Start: 2024-11-20 | End: 2024-11-20

## 2024-11-20 RX ORDER — HYDROCODONE BITARTRATE AND ACETAMINOPHEN 7.5; 325 MG/15ML; MG/15ML
5 SOLUTION ORAL 4 TIMES DAILY PRN
Qty: 30 ML | Refills: 0 | Status: SHIPPED | OUTPATIENT
Start: 2024-11-20

## 2024-11-20 RX ORDER — ONDANSETRON 2 MG/ML
4 INJECTION INTRAMUSCULAR; INTRAVENOUS ONCE
Status: COMPLETED | OUTPATIENT
Start: 2024-11-20 | End: 2024-11-20

## 2024-11-20 RX ADMIN — IOPAMIDOL 48 ML: 755 INJECTION, SOLUTION INTRAVENOUS at 20:02

## 2024-11-20 RX ADMIN — SODIUM CHLORIDE 52 ML: 9 INJECTION, SOLUTION INTRAVENOUS at 20:02

## 2024-11-20 RX ADMIN — ONDANSETRON 4 MG: 2 INJECTION INTRAMUSCULAR; INTRAVENOUS at 19:22

## 2024-11-20 ASSESSMENT — COLUMBIA-SUICIDE SEVERITY RATING SCALE - C-SSRS
6. HAVE YOU EVER DONE ANYTHING, STARTED TO DO ANYTHING, OR PREPARED TO DO ANYTHING TO END YOUR LIFE?: NO
1. IN THE PAST MONTH, HAVE YOU WISHED YOU WERE DEAD OR WISHED YOU COULD GO TO SLEEP AND NOT WAKE UP?: NO
2. HAVE YOU ACTUALLY HAD ANY THOUGHTS OF KILLING YOURSELF IN THE PAST MONTH?: NO

## 2024-11-20 ASSESSMENT — ACTIVITIES OF DAILY LIVING (ADL)
ADLS_ACUITY_SCORE: 0

## 2024-11-21 NOTE — ED TRIAGE NOTES
Pt has been experiencing AB pain since last Saturday. Pt says the pain went away Monday, came back even worse last night. Pain is in RLQ. Pt endorses diarrhea. Denies N/V, difficulty urinating, SOB, chest pain.

## 2024-11-21 NOTE — ED PROVIDER NOTES
"  Emergency Department Note      History of Present Illness     Chief Complaint   Abdominal Pain    HPI   Jordyn Jackman is a 24 year old female who presents to the ED for abdominal pain. On Saturday, the patient began to experiencing right sided, lower abdominal pain. She states the pain fluctuates between sharp and stabbing to a dull pain. The pain is constant. The pain did do away for a bit, but since returning on Monday, it has been constant. She has taken Tylenol for the pain with no relief. She has also used heat and ice packs with no relief. She also endorses recent LUQ burning, as well as liquid diarrhea and lack of appetite. She does have IBS, but has been managing it well. Patient has both her ovaries. Both of her fallopian tubes have been removed. She has a history of endometriosis. Her pain is not similar to this.      Independent Historian   Father    Review of External Notes   Reviewed OB/GYN visit from today. Patient was seen for pelvic pain. She has a history of endometriosis. She was supposed to start her menstrual cycle yesterday but did not.     Past Medical History   Medical History and Problem List   Endometriosis  Iron deficiency  IBS  Migraines  Fibromyalgia  Cervical radiculopathy  Anxiety    Medications   Tizanidine    Surgical History   Bilateral salpingectomy    Physical Exam   Patient Vitals for the past 24 hrs:   BP Temp Temp src Pulse Resp SpO2 Height Weight   11/20/24 2234 103/75 -- -- 69 18 98 % -- --   11/20/24 1801 (!) 125/94 98  F (36.7  C) Oral 94 18 97 % 1.727 m (5' 8\") 43.3 kg (95 lb 7.4 oz)     Physical Exam  General: The patient is alert, in no respiratory distress.    Cardiovascular: Regular rate and rhythm. Good pulses in all four extremities. Normal capillary refill and skin turgor.     Respiratory: Lungs are clear. No nasal flaring. No retractions. No wheezing, no crackles.    Gastrointestinal: Abdomen soft. No guarding, no rebound. No palpable hernias. Tenderness on " right side of abdomen. Lower greater than upper. Palpation of back creates pain in the front.    Musculoskeletal: No gross deformity.     Skin: No rashes or petechiae.     Neurologic: The patient is alert and oriented x3. GCS 15. No testable cranial nerve deficit. Follows commands with clear and appropriate speech. Gives appropriate answers. Good strength in all extremities. No gross neurologic deficit. Gross sensation intact. Pupils are round and reactive. No meningismus.     Lymphatic: No cervical adenopathy. No lower extremity swelling.    Psychiatric: The patient is non-tearful.    Diagnostics   Lab Results   Labs Ordered and Resulted from Time of ED Arrival to Time of ED Departure   COMPREHENSIVE METABOLIC PANEL - Abnormal       Result Value    Sodium 136      Potassium 3.8      Carbon Dioxide (CO2) 20 (*)     Anion Gap 14      Urea Nitrogen 10.2      Creatinine 0.76      GFR Estimate >90      Calcium 9.2      Chloride 102      Glucose 91      Alkaline Phosphatase 76      AST 16      ALT 8      Protein Total 7.9      Albumin 4.4      Bilirubin Total 0.2     ROUTINE UA WITH MICROSCOPIC - Abnormal    Color Urine Light Yellow      Appearance Urine Clear      Glucose Urine Negative      Bilirubin Urine Negative      Ketones Urine Negative      Specific Gravity Urine 1.026      Blood Urine Trace (*)     pH Urine 5.5      Protein Albumin Urine 10 (*)     Urobilinogen Urine Normal      Nitrite Urine Negative      Leukocyte Esterase Urine Negative      Bacteria Urine Few (*)     Mucus Urine Present (*)     RBC Urine 1      WBC Urine 1      Squamous Epithelials Urine 1     LIPASE - Normal    Lipase 37     HCG QUALITATIVE URINE - Normal    hCG Urine Qualitative Negative     HCG QUALITATIVE PREGNANCY - Normal    hCG Serum Qualitative Negative     CBC WITH PLATELETS AND DIFFERENTIAL    WBC Count 6.3      RBC Count 4.26      Hemoglobin 12.7      Hematocrit 38.8      MCV 91      MCH 29.8      MCHC 32.7      RDW 12.5       Platelet Count 273      % Neutrophils 58      % Lymphocytes 32      % Monocytes 8      % Eosinophils 1      % Basophils 1      % Immature Granulocytes 0      NRBCs per 100 WBC 0      Absolute Neutrophils 3.7      Absolute Lymphocytes 2.0      Absolute Monocytes 0.5      Absolute Eosinophils 0.1      Absolute Basophils 0.0      Absolute Immature Granulocytes 0.0      Absolute NRBCs 0.0       Imaging   CT Abdomen Pelvis w Contrast   Final Result   IMPRESSION:    1.  No acute findings or inflammatory changes in the abdomen or pelvis.      2.  Appendix not visualized, although no secondary signs of acute appendicitis.      3.  Multiple cystic lesions in the bilateral ovaries, likely endometriomas given history of endometriosis. Consider correlation with pelvic ultrasound if warranted.            ED Course    Medications Administered   Medications   ondansetron (ZOFRAN) injection 4 mg (4 mg Intravenous $Given 11/20/24 1922)   CT Scan Flush (52 mLs Intravenous $Given 11/20/24 2002)   iopamidol (ISOVUE-370) solution 500 mL (48 mLs Intravenous $Given 11/20/24 2002)     Procedures   Procedures     Discussion of Management   None    ED Course   ED Course as of 11/21/24 0038   Wed Nov 20, 2024   1836 I obtained history and examined the patient as noted above.       Additional Documentation  None    Medical Decision Making / Diagnosis   CMS Diagnoses: None    MIPS       None    MDM   Jordyn Jackman is a 24 year old female who has a history of endometriosis is followed by OB but presents complaining of right-sided abdominal pain.  The patient reports at home when her mother had tried to push on the abdomen and Sunil she had significant pain there was tenderness in the right side of the abdomen as well.  I did consider this point I could not exclude appendicitis and a CT scan was ordered.  The appendix was not visualized I discussed the patient I cannot rule out early appendicitis however more likely they are endometriomas as a  cause of her pain the patient is aware need for close follow-up if that she has increasing or change pain she would need to return she was discharged home in good condition.    Disposition   The patient was discharged.     Diagnosis     ICD-10-CM    1. RLQ abdominal pain  R10.31       2. Endometriosis  N80.9            Discharge Medications   Discharge Medication List as of 11/20/2024 10:35 PM        START taking these medications    Details   !! HYDROcodone-acetaminophen 7.5-325 MG/15ML solution Take 5 mLs by mouth 4 times daily as needed for severe pain., Disp-30 mL, R-0, Local Print       !! - Potential duplicate medications found. Please discuss with provider.        Scribe Disclosure:  I, Juwan Antonio, am serving as a scribe at 6:43 PM on 11/20/2024 to document services personally performed by Jonathan Roman MD based on my observations and the provider's statements to me.        Jonathan Roman MD  11/21/24 0039

## (undated) RX ORDER — ONDANSETRON 2 MG/ML
INJECTION INTRAMUSCULAR; INTRAVENOUS
Status: DISPENSED
Start: 2024-11-20